# Patient Record
Sex: MALE | Race: WHITE | Employment: FULL TIME | URBAN - METROPOLITAN AREA
[De-identification: names, ages, dates, MRNs, and addresses within clinical notes are randomized per-mention and may not be internally consistent; named-entity substitution may affect disease eponyms.]

---

## 2017-09-12 ENCOUNTER — TELEPHONE (OUTPATIENT)
Dept: CARDIOLOGY CLINIC | Age: 57
End: 2017-09-12

## 2017-09-28 ENCOUNTER — OFFICE VISIT (OUTPATIENT)
Dept: CARDIOLOGY CLINIC | Age: 57
End: 2017-09-28

## 2017-09-28 VITALS
SYSTOLIC BLOOD PRESSURE: 118 MMHG | BODY MASS INDEX: 30.71 KG/M2 | WEIGHT: 247 LBS | HEIGHT: 75 IN | HEART RATE: 56 BPM | DIASTOLIC BLOOD PRESSURE: 74 MMHG

## 2017-09-28 DIAGNOSIS — I48.91 ATRIAL FIBRILLATION WITH CONTROLLED VENTRICULAR RESPONSE (HCC): ICD-10-CM

## 2017-09-28 DIAGNOSIS — G25.0 FAMILIAL TREMOR: ICD-10-CM

## 2017-09-28 DIAGNOSIS — I10 ESSENTIAL HYPERTENSION: Primary | ICD-10-CM

## 2017-09-28 PROCEDURE — 99213 OFFICE O/P EST LOW 20 MIN: CPT | Performed by: INTERNAL MEDICINE

## 2017-09-28 PROCEDURE — 93000 ELECTROCARDIOGRAM COMPLETE: CPT | Performed by: INTERNAL MEDICINE

## 2017-10-05 ENCOUNTER — PROCEDURE VISIT (OUTPATIENT)
Dept: CARDIOLOGY CLINIC | Age: 57
End: 2017-10-05

## 2017-10-05 VITALS
WEIGHT: 247 LBS | HEIGHT: 75 IN | HEART RATE: 53 BPM | BODY MASS INDEX: 30.71 KG/M2 | DIASTOLIC BLOOD PRESSURE: 60 MMHG | SYSTOLIC BLOOD PRESSURE: 124 MMHG

## 2017-10-05 DIAGNOSIS — Z82.49 FAMILY HISTORY OF CORONARY ARTERY DISEASE: ICD-10-CM

## 2017-10-05 DIAGNOSIS — I10 ESSENTIAL HYPERTENSION: ICD-10-CM

## 2017-10-05 DIAGNOSIS — I48.91 ATRIAL FIBRILLATION WITH CONTROLLED VENTRICULAR RESPONSE (HCC): Primary | ICD-10-CM

## 2017-10-05 DIAGNOSIS — Z01.818 PRE-OP EVALUATION: ICD-10-CM

## 2017-10-05 DIAGNOSIS — I48.91 ATRIAL FIBRILLATION WITH CONTROLLED VENTRICULAR RESPONSE (HCC): ICD-10-CM

## 2017-10-05 DIAGNOSIS — G25.0 FAMILIAL TREMOR: ICD-10-CM

## 2017-10-05 LAB
LV EF: 53 %
LVEF MODALITY: NORMAL

## 2017-10-05 PROCEDURE — 93015 CV STRESS TEST SUPVJ I&R: CPT | Performed by: INTERNAL MEDICINE

## 2017-10-05 PROCEDURE — 93306 TTE W/DOPPLER COMPLETE: CPT | Performed by: INTERNAL MEDICINE

## 2017-10-09 ENCOUNTER — TELEPHONE (OUTPATIENT)
Dept: CARDIOLOGY CLINIC | Age: 57
End: 2017-10-09

## 2017-10-09 NOTE — TELEPHONE ENCOUNTER
Please call Jacqui Ortiz at Dr Amanda Eduardo about cardiac clearance, [phone] ext [phone].   She is refaxing the clearance request.

## 2017-10-09 NOTE — TELEPHONE ENCOUNTER
Left message on voicemail for normal echo Per HIPAA. Normal left ventricle structure and function. Ejection fraction is low normal, visually estimated at 50-55%. No significant valvular disease noted. No evidence of pericardial effusion. Unremarkable echo.

## 2017-10-09 NOTE — LETTER
[address]  [facility] [address] [place]  Phone: [phone]    Fax [phone]                  Alicia Suarez MD, Sreedhar Munoz MD, Ada Owusu MD, Alfonse Hodgkins, MD, MD Crystal Guerrero MD Clarissa Gut, MD      Cardiac Risk Assessment       10/11/2017    Surgery Date: 10/12/17       Surgery: Excision of Parotid Tumor       Anesthesia Type: General       Fax Number: [phone]  To: Dr. Gina Gonzalez  From : Dr. Rachel Hooper    Patient Name: Yany Berman     YOB: 1960     Joseline Toribio was evaluated from a cardiac standpoint for his surgery and based on his history, diagnosis, recent cardiac testing, he is considered a low risk candidate for any dionne-operative cardiac complications. Please continue patient's current medical regimen. Please call with any further questions.     Respectfully,     Rachel Hooper AM/jerels

## 2017-10-10 NOTE — TELEPHONE ENCOUNTER
Geoff Tena from Dr. Rosalina Oshea called to check on the cardiac clearance. Pt is having surgery on Thursday 10-12.     Phone # [phone] EXT [phone]  Fax # [phone]

## 2017-10-11 ENCOUNTER — TELEPHONE (OUTPATIENT)
Dept: CARDIOLOGY CLINIC | Age: 57
End: 2017-10-11

## 2017-10-11 NOTE — TELEPHONE ENCOUNTER
Patient cleared for surgery at a low risk. Letter faxed to Dr. Tess Chavez.   Kiran Alfaro at his office notified

## 2017-10-11 NOTE — TELEPHONE ENCOUNTER
Kiran Alfaro calling from Dr. Tess Chavez office about clearance needed tomorrow, please return her call or fax clearance.

## 2021-03-01 ENCOUNTER — HOSPITAL ENCOUNTER (OUTPATIENT)
Age: 61
Discharge: HOME | End: 2021-03-01
Payer: COMMERCIAL

## 2021-03-01 ENCOUNTER — OFFICE VISIT (OUTPATIENT)
Dept: CARDIOLOGY CLINIC | Age: 61
End: 2021-03-01
Payer: COMMERCIAL

## 2021-03-01 VITALS
HEIGHT: 75 IN | BODY MASS INDEX: 27.98 KG/M2 | SYSTOLIC BLOOD PRESSURE: 120 MMHG | DIASTOLIC BLOOD PRESSURE: 80 MMHG | WEIGHT: 225 LBS

## 2021-03-01 DIAGNOSIS — Z82.49 FAMILY HISTORY OF CORONARY ARTERY DISEASE: ICD-10-CM

## 2021-03-01 DIAGNOSIS — I48.0 PAROXYSMAL ATRIAL FIBRILLATION (HCC): Primary | ICD-10-CM

## 2021-03-01 DIAGNOSIS — I10 ESSENTIAL HYPERTENSION: ICD-10-CM

## 2021-03-01 DIAGNOSIS — Z01.810 PRE-OPERATIVE CARDIOVASCULAR EXAMINATION: Primary | ICD-10-CM

## 2021-03-01 LAB
ANION GAP SERPL CALCULATED.3IONS-SCNC: 9 MMOL/L (ref 4–16)
BUN BLDV-MCNC: 12 MG/DL (ref 6–23)
CALCIUM SERPL-MCNC: 9.4 MG/DL (ref 8.3–10.6)
CHLORIDE BLD-SCNC: 104 MMOL/L (ref 99–110)
CO2: 26 MMOL/L (ref 21–32)
CREAT SERPL-MCNC: 1 MG/DL (ref 0.9–1.3)
GFR AFRICAN AMERICAN: >60 ML/MIN/1.73M2
GFR NON-AFRICAN AMERICAN: >60 ML/MIN/1.73M2
GLUCOSE BLD-MCNC: 101 MG/DL (ref 70–99)
INR BLD: 1.1 INDEX
POTASSIUM SERPL-SCNC: 4.6 MMOL/L (ref 3.5–5.1)
PROTHROMBIN TIME: 13.3 SECONDS (ref 11.7–14.5)
SODIUM BLD-SCNC: 139 MMOL/L (ref 135–145)

## 2021-03-01 PROCEDURE — 93000 ELECTROCARDIOGRAM COMPLETE: CPT | Performed by: INTERNAL MEDICINE

## 2021-03-01 PROCEDURE — 80048 BASIC METABOLIC PNL TOTAL CA: CPT

## 2021-03-01 PROCEDURE — 99214 OFFICE O/P EST MOD 30 MIN: CPT | Performed by: INTERNAL MEDICINE

## 2021-03-01 PROCEDURE — 85610 PROTHROMBIN TIME: CPT

## 2021-03-01 PROCEDURE — 36415 COLL VENOUS BLD VENIPUNCTURE: CPT

## 2021-03-01 RX ORDER — PROPAFENONE HYDROCHLORIDE 225 MG/1
225 CAPSULE, EXTENDED RELEASE ORAL 2 TIMES DAILY
Qty: 60 CAPSULE | Refills: 3 | Status: SHIPPED | OUTPATIENT
Start: 2021-03-01 | End: 2021-06-21

## 2021-03-01 NOTE — PROGRESS NOTES
OFFICE PROGRESS NOTES      Wanda Thompson is a 63 y.o. male who has    CHIEF COMPLAINT AS FOLLOWS:  CHEST PAIN: Patient denies any C/O chest pains at this time. SOB: No C/O SOB at this time. LEG EDEMA: No leg edema   PALPITATIONS: Denies any C/O Palpitations   DIZZINESS: No C/O Dizziness   SYNCOPE: None   OTHER:                                     HPI: Patient is here for F/U on his  HTN & PAF problems. Arrhythmia: Patient has known Hx of PAF with low CHADS score. HTN: Patient has known Hx of essential HTN. Has been treated with guideline recommended medical / physical/ diet therapy as stated below. He does not have any new complaints at this time. Current Outpatient Medications   Medication Sig Dispense Refill    apixaban (ELIQUIS) 5 MG TABS tablet Take 5 mg by mouth 2 times daily      ibuprofen (ADVIL;MOTRIN) 600 MG tablet Take 600 mg by mouth daily.  lisinopril (PRINIVIL;ZESTRIL) 5 MG tablet Take 1 tablet by mouth daily. 30 tablet 6    citalopram (CELEXA) 20 MG tablet Take 20 mg by mouth daily.  Multiple Vitamin (MULTIVITAMIN PO) Take 1 tablet by mouth daily.  propranolol (INDERAL LA) 80 MG CR capsule Take 80 mg by mouth daily. No current facility-administered medications for this visit. Allergies: Patient has no known allergies.   Review of Systems:    Constitutional: Negative for diaphoresis and fatigue  Respiratory: Negative for shortness of breath  Cardiovascular: Negative for chest pain, dyspnea on exertion, claudication, edema, irregular heartbeat, murmur, palpitations or shortness of breath  Musculoskeletal: Negative for muscle pain, muscular weakness, negative for pain in arm and leg or swelling in foot and leg    Objective:  /80   Ht 6' 3\" (1.905 m)   Wt 225 lb (102.1 kg)   BMI 28.12 kg/m²  80/min  Wt Readings from Last 3 Encounters:   03/01/21 225 lb (102.1 kg)   10/05/17 247 lb (112 kg)   09/28/17 247 lb (112 kg) Body mass index is 28.12 kg/m². GENERAL - Alert, oriented, pleasant, in no apparent distress. EYES: No jaundice, no conjunctival pallor. Neck - Supple. No jugular venous distention noted. No carotid bruits. Cardiovascular  Normal S1 and S2 without obvious murmur or gallop. Extremities - No cyanosis, clubbing, or significant edema. Pulmonary  No respiratory distress. No wheezes or rales. Lab Review   No results found for: TROPONINT  Lab Results   Component Value Date    BNP 19 07/22/2012     Lab Results   Component Value Date    INR 1.08 07/22/2012     No results found for: LABA1C  Lab Results   Component Value Date    WBC 6.9 07/23/2012    WBC 7.4 07/22/2012    HCT 45.9 07/23/2012    HCT 46.3 07/22/2012    MCV 91.4 07/23/2012    MCV 90.9 07/22/2012     07/23/2012     07/22/2012     Lab Results   Component Value Date    CHOL 176 09/14/2011    TRIG 55 09/14/2011    HDL 56 (L) 09/14/2011    LDLDIRECT 122 (H) 09/14/2011     Lab Results   Component Value Date    ALT 22 07/22/2012    ALT 28 06/19/2012    AST 32 07/22/2012    AST 33 06/19/2012     BMP:    Lab Results   Component Value Date     08/09/2012     07/23/2012    K 4.3 08/09/2012    K 3.8 07/23/2012     08/09/2012     07/23/2012    CO2 25 07/23/2012    CO2 26 07/22/2012    BUN 8 08/09/2012    BUN 15 07/23/2012    CREATININE 1.1 08/09/2012    CREATININE 0.9 07/23/2012     CMP:   Lab Results   Component Value Date     08/09/2012     07/23/2012    K 4.3 08/09/2012    K 3.8 07/23/2012     08/09/2012     07/23/2012    CO2 25 07/23/2012    CO2 26 07/22/2012    BUN 8 08/09/2012    BUN 15 07/23/2012    CREATININE 1.1 08/09/2012    CREATININE 0.9 07/23/2012    PROT 7.1 07/22/2012    PROT 6.9 06/19/2012     Lab Results   Component Value Date    TSHHS 1.328 07/22/2012    TSHHS 0.913 06/19/2012     ECHO 10/2017   Normal left ventricle structure and function. Ejection fraction is low normal, visually estimated at 50-55%. No significant valvular disease noted. No evidence of pericardial effusion. Unremarkable echo. ETT 10/2017   Excellent exercise capacity. 11 METs work load.    Physiological BP response to exercise.    ETT non diagnostic as THR is not achieved even at high work load. QUALITY MEASURES REVIEWED:  1.CAD:Patient is taking anti platelet agent:No  Patient does not have Hx of documented CAD  2. DYSLIPIDEMIA: Patient is on cholesterol lowering medication:No   3. Beta-Blocker therapy for CAD, if prior Myocardial Infarction:Yes   4. Counselled regarding smoking cessation. No   Patient does not Smoke. 5.Anticoagulation therapy (for A.Fib) Yes   Does Not have A.Fib.  6.Discussed weight management strategies. Assessment & Plan:    Primary / Secondary prevention is the goal by aggressive risk modification, healthy and therapeutic life style changes for cardiovascular risk reduction. Various goals are discussed and multiple questions answered. CAD:None known  HTN:Yes  well controlled on current medical regimen, see list above. - changes in  treatment:   no   CARDIOMYOPATHY:No  CONGESTIVE HEART FAILURE:No    VHD:no   No significant VHD noted  DYSLIPIDEMIA: no  ARRHYTHMIAS:yes, CHADS is 1  Patient is back in Atrial fibrillation  He is rate controlled &  on anticoagulation. OTHER RELEVANT DIAGNOSIS:as noted in patient's active problem list:  TESTS ORDERED:   TE GUIDED Cardioversion  All previously ordered tests reviewed. MEDICATIONS: CPM+                               Start rythmol SR     Office f/u TWO WEEKS AFTER CARDIOVERSION.

## 2021-03-01 NOTE — LETTER
Sadaf Carpio  1960  [id number]    Have you had any Chest Pain that is not new? - No       DO EKG IF: Patient has a Heart Rate above 100 or below 40     CAD (Coronary Artery Disease) patient should have one on file every 6 months        Have you had any Shortness of Breath - No      Have you had any dizziness - No     Sitting wait 5 minutes do supine (laying down) wait 5 minutes then do standing - log each in \"vitals\" area in Epic   Be sure to ask what symptoms they are having if they get dizzy while completing ortho stats such as room spinning, nausea, etc.    Have you had any palpitations that are not new?  - Yes  If Yes DO EKG - Do you feel your heart racing, pounding, skipping, fluttering  How long does it last - .all day   All Day     On going the last 12 days   Dizziness when he stands        Is the patient on any of the following medications -     Do you have any edema - swelling in No      Do you have a surgery or procedure scheduled in the near future - No per pt

## 2021-03-01 NOTE — PATIENT INSTRUCTIONS
Please be informed that if you contact our office outside of normal business hours the physician on call cannot help with any scheduling or rescheduling issues, procedure instruction questions or any type of medication issue. We advise you for any urgent/emergency that you go to the nearest emergency room! PLEASE CALL OUR OFFICE DURING NORMAL BUSINESS HOURS    Monday - Friday   8 am to 5 pm    [facility] [address]: [phone]    [place]:  [phone]  **It is YOUR responsibilty to bring medication bottles and/or updated medication list to [address] [place] [address]. This will allow us to better serve you and all your healthcare needs**    CAD:None known  HTN:Yes  well controlled on current medical regimen, see list above. - changes in  treatment:   no   CARDIOMYOPATHY:No  CONGESTIVE HEART FAILURE:No    VHD:no   No significant VHD noted  DYSLIPIDEMIA: no  ARRHYTHMIAS:yes, CHADS is 1  Patient is back in Atrial fibrillation  He is rate controlled &  on anticoagulation. OTHER RELEVANT DIAGNOSIS:as noted in patient's active problem list:  TESTS ORDERED:   TE GUIDED Cardioversion  All previously ordered tests reviewed. MEDICATIONS: CPM+                               Start rythmol SR     Office f/u TWO WEEKS AFTER CARDIOVERSION.

## 2021-03-01 NOTE — PROGRESS NOTES
Pt here in office & educated on ERIC/Cardioversion for Dx: Atrial Fibrillation, scheduled for 3/3/21 @ 2:00, w/arrival @ 12:00, @ [facility]; risks explained; & consents signed. Pre-admission orders given to pt for labs & CXR, which are due 3/2/21 @ [facility]. Instructions given to pt to:  NPO after midnight night before procedure; Call hospital @ [phone] to pre-register; May take morning meds the am of procedure. Patient was notified that procedure could be delayed due to an emergency. Patient voiced understanding. Copies of consent, pre-testing orders, & info. sheet scanned into media. Patient to have Covid test on arrival.  Patient advised to quarantine until after procedure.

## 2021-03-02 NOTE — H&P
HISTORY & PHYSICAL        CHIEF COMPLAINT: Irregular rhythm    HISTORY OF PRESENT ILLNESS:  Mayelin Elam is a 63 y.o. male  With Atrial fibrillation. Patient was cardioverted in the past & remained in NSR for many years & was on Rythmol. Apparently he stopped Rythmol some time ago. Now he has sustained atrial fibrillation. Shannon Kaur has the following history recorded in [facility]:  Patient Active Problem List    Diagnosis Date Noted    Family history of coronary artery disease     HTN (hypertension)     Atrial fibrillation (HCC)     [facility] ([facility]     Familial tremor     Ulcerative colitis, chronic ([facility] 07/23/2012    Atrial fibrillation with controlled ventricular response (HCC) 06/19/2012     Current Outpatient Medications   Medication Sig Dispense Refill    apixaban (ELIQUIS) 5 MG TABS tablet Take 5 mg by mouth 2 times daily      propafenone (RYTHMOL SR) 225 MG extended release capsule Take 1 capsule by mouth 2 times daily 60 capsule 3    ibuprofen (ADVIL;MOTRIN) 600 MG tablet Take 600 mg by mouth daily.  lisinopril (PRINIVIL;ZESTRIL) 5 MG tablet Take 1 tablet by mouth daily. 30 tablet 6    citalopram (CELEXA) 20 MG tablet Take 20 mg by mouth daily.  Multiple Vitamin (MULTIVITAMIN PO) Take 1 tablet by mouth daily.  propranolol (INDERAL LA) 80 MG CR capsule Take 80 mg by mouth daily. No current facility-administered medications for this encounter. Allergies: Patient has no known allergies. Past Medical History:   Diagnosis Date    Atrial fibrillation [facility]) 2006 7/23/12 Successful direct current cardioversion    Familial tremor     Family history of coronary artery disease     H/O 24 hour EKG monitoring 4/6/06    The 48 holter monitor reveals the patient in SR with rare ventricular ectopic beat and rare supraventricular ectopic beat.     H/O cardiovascular stress test 12    Probably normal perfusion Lexiscan Cardiolite study except for diaphragmatic artifact. Low-normal LV function, EF 50%    H/O echocardiogram 12    Mild left atrial enlargement and right ventricular enlargement. Left ventricular hypertrophy with global hypokinesis and moderately reduced LV function. EF 35-40%  Mild pulmomary HTN. Mild TR    H/O transesophageal echocardiography (ERIC) for monitoring 12    Other than mild MR and TR, no other significant abnormalities seen. There is no intracavitary mass or thrombus, there is no atrial septal defect or patent foramen ovale    History of Doppler echocardiogram 10/05/2017    Normal left ventricle structure and function. Ejection fraction is low normal, visually estimated at 50-55%. No significant valvular disease noted. No evidence of pericardial effusion. Unremarkable echo.  History of exercise stress test 10/05/2017    treadmill    HTN (hypertension)     Pre-op evaluation     Ulcerative colitis ([facility]      Past Surgical History:   Procedure Laterality Date    COLECTOMY      PROCTECTOMY       Family History   Problem Relation Age of Onset    Heart Disease Mother     High Blood Pressure Mother     Heart Disease Father     Birth Defects Maternal Grandmother      Social History     Tobacco Use    Smoking status: Former Smoker     Quit date: 3/1/2001     Years since quittin.0    Smokeless tobacco: Never Used   Substance Use Topics    Alcohol use: Yes     Comment: 2-3 beers 2-3 times weekly      Review of systems:  HEENT: Neg  Card:Neg   GI;Neg  : Neg  Neuro: Neg  Psych: Neg  Derm: Neg  MS; Neg  All: Documented  Constitutional: Neg    Objective:    /80   Ht 6' 3\" (1.905 m)   Wt 225 lb (102.1 kg)   BMI 28.12 kg/m²  80/min    Wt Readings from Last 3 Encounters:   21 225 lb (102.1 kg)   10/05/17 247 lb (112 kg)   17 247 lb (112 kg)     GENERAL - Alert, oriented, pleasant, in no apparent distress. HEENT - Unremarkable. Neck - Supple. No jugular venous distention noted. No carotid bruits. Cardiovascular - Normal S1 and S2 without obvious murmur or gallop. Extremities - No cyanosis, clubbing, or significant edema. Pulmonary - No respiratory distress. No wheezes or rales. Abdomen - No masses, tenderness, or organomegaly. Musculoskeletal - No significant edema. Neurologic - Cranial nerves II through XII are grossly intact. There were no gross focal neurologic abnormalities. Lab Review   Lab Results   Component Value Date    CKTOTAL 133 07/22/2012    CKMB 2.6 07/22/2012    TROPONINI <0.006 07/23/2012     BNP:    Lab Results   Component Value Date    BNP 19 07/22/2012     PT/INR:  No results found for: PTINR  No results found for: LABA1C  Lab Results   Component Value Date    CHOL 176 09/14/2011    TRIG 55 09/14/2011    HDL 56 (L) 09/14/2011    LDLDIRECT 122 (H) 09/14/2011     Lab Results   Component Value Date    ALT 22 07/22/2012    AST 32 07/22/2012     BMP:    Lab Results   Component Value Date     03/01/2021    K 4.6 03/01/2021     03/01/2021    CO2 26 03/01/2021    BUN 12 03/01/2021     CMP:   Lab Results   Component Value Date     03/01/2021    K 4.6 03/01/2021     03/01/2021    CO2 26 03/01/2021    BUN 12 03/01/2021    PROT 7.1 07/22/2012     TSH:  No results found for: TSH      Impression:    Patient Active Problem List   Diagnosis    Atrial fibrillation with controlled ventricular response ([address]    Ulcerative colitis, chronic ([address]    HTN (hypertension)    Atrial fibrillation (HCC)    Ulcerative colitis ([address]    Familial tremor    Family history of coronary artery disease       Plan:  TE GUIDED Cardioversion  Informed consent obtained. ASA & Mallampati 2:2  Further recommendations to be based on results.

## 2021-03-03 ENCOUNTER — HOSPITAL ENCOUNTER (OUTPATIENT)
Age: 61
Discharge: HOME | End: 2021-03-03
Payer: COMMERCIAL

## 2021-03-03 ENCOUNTER — HOSPITAL ENCOUNTER (OUTPATIENT)
Dept: NON INVASIVE DIAGNOSTICS | Age: 61
Discharge: HOME | End: 2021-03-03
Payer: COMMERCIAL

## 2021-03-03 VITALS
OXYGEN SATURATION: 97 % | DIASTOLIC BLOOD PRESSURE: 69 MMHG | RESPIRATION RATE: 18 BRPM | SYSTOLIC BLOOD PRESSURE: 100 MMHG | HEART RATE: 60 BPM

## 2021-03-03 LAB
EKG ATRIAL RATE: 51 BPM
EKG ATRIAL RATE: 87 BPM
EKG DIAGNOSIS: NORMAL
EKG DIAGNOSIS: NORMAL
EKG P AXIS: 62 DEGREES
EKG P-R INTERVAL: 174 MS
EKG Q-T INTERVAL: 364 MS
EKG Q-T INTERVAL: 402 MS
EKG QRS DURATION: 86 MS
EKG QRS DURATION: 96 MS
EKG QTC CALCULATION (BAZETT): 370 MS
EKG QTC CALCULATION (BAZETT): 381 MS
EKG R AXIS: 60 DEGREES
EKG R AXIS: 67 DEGREES
EKG T AXIS: 62 DEGREES
EKG T AXIS: 62 DEGREES
EKG VENTRICULAR RATE: 51 BPM
EKG VENTRICULAR RATE: 66 BPM
SARS-COV-2, NAAT: NOT DETECTED
SOURCE: NORMAL

## 2021-03-03 PROCEDURE — 92960 CARDIOVERSION ELECTRIC EXT: CPT

## 2021-03-03 PROCEDURE — 93005 ELECTROCARDIOGRAM TRACING: CPT | Performed by: INTERNAL MEDICINE

## 2021-03-03 PROCEDURE — 93312 ECHO TRANSESOPHAGEAL: CPT

## 2021-03-03 PROCEDURE — 93010 ELECTROCARDIOGRAM REPORT: CPT | Performed by: INTERNAL MEDICINE

## 2021-03-03 PROCEDURE — 92960 CARDIOVERSION ELECTRIC EXT: CPT | Performed by: INTERNAL MEDICINE

## 2021-03-03 PROCEDURE — 7100000000 HC PACU RECOVERY - FIRST 15 MIN

## 2021-03-03 PROCEDURE — 93312 ECHO TRANSESOPHAGEAL: CPT | Performed by: INTERNAL MEDICINE

## 2021-03-03 PROCEDURE — 87635 SARS-COV-2 COVID-19 AMP PRB: CPT

## 2021-03-03 PROCEDURE — 7100000001 HC PACU RECOVERY - ADDTL 15 MIN

## 2021-03-03 NOTE — PROCEDURES
TE Guided D/C cardioversion was performed successfully with no complications.   Dictation # [id number]

## 2021-03-04 NOTE — OP NOTE
[address], [address] [place] [address]                                OPERATIVE REPORT    PATIENT NAME: Sangeetha Calderon              :        1960  MED REC NO:   [id number]                          ROOM:  ACCOUNT NO:   [de-identified]                           ADMIT DATE: 2021  PROVIDER:     Junior Wills MD    DATE OF PROCEDURE:  2021    REFERRING PHYSICIAN:  Heidi Duncan MD    BRIEF HISTORY:  The patient is a 58-year-old gentleman with known  history of atrial fibrillation in the past, who had cardioversion and  had remained in sinus rhythm, on Rythmol therapy for many years, then  for some time, he has not been on Rythmol therapy and he did go back  into atrial fibrillation which is now sustained for several weeks. When  I saw him in office, I started him on Eliquis therapy and gave him a  prescription for Rythmol for this time. The patient was scheduled to  undergo ERIC-guided cardioversion. INDICATION FOR THE PROCEDURE:  Sustained atrial fibrillation. DESCRIPTION OF PROCEDURE:  Once transesophageal echocardiographic study  revealed that there is no spontaneous contrast or thrombus in the left  atrial chamber or left atrial appendage, direct current cardioversion  was performed utilizing standard technique with a biphasic machine and  remotely controlled pads. The patient was under IV Versed and fentanyl  sedation. A single shock utilizing 120 joules synchronized energy was  used. The patient converted to normal sinus rhythm. There were no  immediate complications. A 12-lead EKG was obtained. PLAN:  The patient to continue Rythmol SR and Eliquis therapy. A  followup is arranged in my office in a month's time.         Jeffrey Caballero MD    D: 2021 18:49:08       T: 2021 21:37:07     [id number]  Job#: [id number]     Doc#: [id number]    CC:

## 2021-03-12 ENCOUNTER — OFFICE VISIT (OUTPATIENT)
Dept: CARDIOLOGY CLINIC | Age: 61
End: 2021-03-12
Payer: COMMERCIAL

## 2021-03-12 VITALS
WEIGHT: 224 LBS | SYSTOLIC BLOOD PRESSURE: 112 MMHG | HEIGHT: 75 IN | BODY MASS INDEX: 27.85 KG/M2 | DIASTOLIC BLOOD PRESSURE: 72 MMHG

## 2021-03-12 DIAGNOSIS — Z82.49 FAMILY HISTORY OF CORONARY ARTERY DISEASE: ICD-10-CM

## 2021-03-12 DIAGNOSIS — I48.0 PAROXYSMAL ATRIAL FIBRILLATION (HCC): Primary | ICD-10-CM

## 2021-03-12 DIAGNOSIS — G25.0 FAMILIAL TREMOR: ICD-10-CM

## 2021-03-12 DIAGNOSIS — I48.91 ATRIAL FIBRILLATION WITH CONTROLLED VENTRICULAR RESPONSE (HCC): ICD-10-CM

## 2021-03-12 DIAGNOSIS — I10 ESSENTIAL HYPERTENSION: ICD-10-CM

## 2021-03-12 PROCEDURE — 99213 OFFICE O/P EST LOW 20 MIN: CPT | Performed by: INTERNAL MEDICINE

## 2021-03-12 NOTE — LETTER
Kristan Neely Dial  1960  [id number]    Have you had any Chest Pain that is not new? - No    Have you had any Shortness of Breath - No    Have you had any dizziness - No    Have you had any palpitations that are not new?  - No      Is the patient on any of the following medications - Rhythmol (Propafenone)  If Yes DO EKG - Needs done every 6 months    Do you have any edema - swelling in No      Do you have a surgery or procedure scheduled in the near future - No per pt

## 2021-03-12 NOTE — PROGRESS NOTES
OFFICE PROGRESS NOTES      Tripp Deleon is a 63 y.o. male who has    CHIEF COMPLAINT AS FOLLOWS:  CHEST PAIN: Patient denies any C/O chest pains at this time. SOB: No C/O SOB at this time. LEG EDEMA: No leg edema   PALPITATIONS: Denies any C/O Palpitations   DIZZINESS: No C/O Dizziness   SYNCOPE: None   OTHER:                                     HPI: Patient is here for F/U on his HTN & PAF problems. Arrhythmia: Patient has known Hx of PAF. HTN: Patient has known Hx of essential HTN. Has been treated with guideline recommended medical / physical/ diet therapy as stated below. He does not have any new complaints at this time. Current Outpatient Medications   Medication Sig Dispense Refill    propafenone (RYTHMOL SR) 225 MG extended release capsule Take 1 capsule by mouth 2 times daily 60 capsule 3    lisinopril (PRINIVIL;ZESTRIL) 5 MG tablet Take 1 tablet by mouth daily. 30 tablet 6    citalopram (CELEXA) 20 MG tablet Take 20 mg by mouth daily.  Multiple Vitamin (MULTIVITAMIN PO) Take 1 tablet by mouth daily.  propranolol (INDERAL LA) 80 MG CR capsule Take 80 mg by mouth daily.  ibuprofen (ADVIL;MOTRIN) 600 MG tablet Take 600 mg by mouth daily. No current facility-administered medications for this visit. Allergies: Patient has no known allergies.   Review of Systems:    Constitutional: Negative for diaphoresis and fatigue  Respiratory: Negative for shortness of breath  Cardiovascular: Negative for chest pain, dyspnea on exertion, claudication, edema, irregular heartbeat, murmur, palpitations or shortness of breath  Musculoskeletal: Negative for muscle pain, muscular weakness, negative for pain in arm and leg or swelling in foot and leg    Objective:  /72   Ht 6' 3\" (1.905 m)   Wt 224 lb (101.6 kg)   BMI 28.00 kg/m²   Wt Readings from Last 3 Encounters:   03/12/21 224 lb (101.6 kg)   03/01/21 225 lb (102.1 kg)   10/05/17 247 lb (112 kg)     Body mass index is 28 kg/m². GENERAL - Alert, oriented, pleasant, in no apparent distress. EYES: No jaundice, no conjunctival pallor. Neck - Supple. No jugular venous distention noted. No carotid bruits. Cardiovascular  Normal S1 and S2 without obvious murmur or gallop. Extremities - No cyanosis, clubbing, or significant edema. Pulmonary  No respiratory distress. No wheezes or rales. Lab Review   No results found for: TROPONINT  Lab Results   Component Value Date    BNP 19 07/22/2012     Lab Results   Component Value Date    INR 1.10 03/01/2021    INR 1.08 07/22/2012     No results found for: LABA1C  Lab Results   Component Value Date    WBC 6.9 07/23/2012    WBC 7.4 07/22/2012    HCT 45.9 07/23/2012    HCT 46.3 07/22/2012    MCV 91.4 07/23/2012    MCV 90.9 07/22/2012     07/23/2012     07/22/2012     Lab Results   Component Value Date    CHOL 176 09/14/2011    TRIG 55 09/14/2011    HDL 56 (L) 09/14/2011    LDLDIRECT 122 (H) 09/14/2011     Lab Results   Component Value Date    ALT 22 07/22/2012    ALT 28 06/19/2012    AST 32 07/22/2012    AST 33 06/19/2012     BMP:    Lab Results   Component Value Date     03/01/2021     08/09/2012    K 4.6 03/01/2021    K 4.3 08/09/2012     03/01/2021     08/09/2012    CO2 26 03/01/2021    CO2 25 07/23/2012    BUN 12 03/01/2021    BUN 8 08/09/2012    CREATININE 1.0 03/01/2021    CREATININE 1.1 08/09/2012     CMP:   Lab Results   Component Value Date     03/01/2021     08/09/2012    K 4.6 03/01/2021    K 4.3 08/09/2012     03/01/2021     08/09/2012    CO2 26 03/01/2021    CO2 25 07/23/2012    BUN 12 03/01/2021    BUN 8 08/09/2012    CREATININE 1.0 03/01/2021    CREATININE 1.1 08/09/2012    PROT 7.1 07/22/2012    PROT 6.9 06/19/2012     Lab Results   Component Value Date    TSH 1.328 07/22/2012    TSHHS 0.913 06/19/2012     EKG: Sinus BRADYCARDIA 50/min.  QTc 415  QUALITY MEASURES REVIEWED: 1.CAD:Patient is taking anti platelet agent:No  Patient does not have Hx of documented CAD  2. DYSLIPIDEMIA: Patient is on cholesterol lowering medication:No   Patient does not tolerate, secondary to side-effects. 3.Beta-Blocker therapy for CAD, if prior Myocardial Infarction:Yes   Due to side-effect(s) / Bradycardia  4. Counselled regarding smoking cessation. No   Patient does not Smoke. 5.Anticoagulation therapy (for A.Fib) Yes   Does Not have A.Fib.  6.Discussed weight management strategies. Assessment & Plan:    Primary / Secondary prevention is the goal by aggressive risk modification, healthy and therapeutic life style changes for cardiovascular risk reduction. Various goals are discussed and multiple questions answered. CAD:None known  HTN:Yes  well controlled on current medical regimen, see list above. - changes in  treatment:   no   CARDIOMYOPATHY:No  CONGESTIVE HEART FAILURE:No    VHD:no              No significant VHD noted  DYSLIPIDEMIA: no  ARRHYTHMIAS:yes, CHADS is 1  Atrial fibrillation S/P Cardioversion  He is rate controlled &  on anticoagulation. Patient can stop Eliquis a month after cardioversion. Stay on Rythmol. OTHER RELEVANT DIAGNOSIS:as noted in patient's active problem list:  TESTS ORDERED:   None this visit. All previously ordered tests reviewed. MEDICATIONS: CPM. Propranolol is for tremors. Office f/u in 3 months.

## 2021-03-12 NOTE — PATIENT INSTRUCTIONS
Please be informed that if you contact our office outside of normal business hours the physician on call cannot help with any scheduling or rescheduling issues, procedure instruction questions or any type of medication issue. We advise you for any urgent/emergency that you go to the nearest emergency room! PLEASE CALL OUR OFFICE DURING NORMAL BUSINESS HOURS    Monday - Friday   8 am to 5 pm    Debbie Wu 12: [phone]    [place]:  [phone]  **It is YOUR responsibilty to bring medication bottles and/or updated medication list to [address] [place] [address]. This will allow us to better serve you and all your healthcare needs**    CAD:None known  HTN:Yes  well controlled on current medical regimen, see list above. - changes in  treatment:   no   CARDIOMYOPATHY:No  CONGESTIVE HEART FAILURE:No    VHD:no              No significant VHD noted  DYSLIPIDEMIA: no  ARRHYTHMIAS:yes, CHADS is 1  Atrial fibrillation S/P Cardioversion  He is rate controlled &  on anticoagulation. Patient can stop Eliquis a month after cardioversion. Stay on Rythmol. OTHER RELEVANT DIAGNOSIS:as noted in patient's active problem list:  TESTS ORDERED:   None this visit. All previously ordered tests reviewed. MEDICATIONS: CPM. Propranolol is for tremors. Office f/u in 3 months.

## 2021-03-12 NOTE — LETTER
[facility]  [address] [address]  Phone: [phone]  Fax: [phone]    Vini Hernandez MD        March 12, 2021     Vera Monday  [address] [address]    Patient: Maliha Abarca  MR Number: [id number]  YOB: 1960  Date of Visit: 3/12/2021    Dear Dr. Gamez Monday: Thank you for the request for consultation for Maliha Abarca to me for the evaluation of PAF. Below are the relevant portions of my assessment and plan of care. If you have questions, please do not hesitate to call me. I look forward to following Vito Merino along with you.     Sincerely,        Vini Hernandez MD

## 2021-06-07 ENCOUNTER — OFFICE VISIT (OUTPATIENT)
Dept: CARDIOLOGY CLINIC | Age: 61
End: 2021-06-07
Payer: COMMERCIAL

## 2021-06-07 VITALS
DIASTOLIC BLOOD PRESSURE: 82 MMHG | BODY MASS INDEX: 27.6 KG/M2 | SYSTOLIC BLOOD PRESSURE: 122 MMHG | HEART RATE: 63 BPM | WEIGHT: 222 LBS | HEIGHT: 75 IN

## 2021-06-07 DIAGNOSIS — Z82.49 FAMILY HISTORY OF CORONARY ARTERY DISEASE: ICD-10-CM

## 2021-06-07 DIAGNOSIS — G25.0 FAMILIAL TREMOR: ICD-10-CM

## 2021-06-07 DIAGNOSIS — I48.0 PAROXYSMAL ATRIAL FIBRILLATION (HCC): ICD-10-CM

## 2021-06-07 DIAGNOSIS — I10 ESSENTIAL HYPERTENSION: Primary | ICD-10-CM

## 2021-06-07 PROCEDURE — 99213 OFFICE O/P EST LOW 20 MIN: CPT | Performed by: INTERNAL MEDICINE

## 2021-06-07 NOTE — LETTER
[facility] [address]  [address] [address]  Phone: [phone]  Fax: [phone]    Delio Gan MD    June 7, 2021     Enmanuel Sorenson  [address] [place] [address]    Patient: Karlie James   MR Number: [id number]   YOB: 1960   Date of Visit: 6/7/2021       Dear Enmanuel Sorenson: Thank you for referring Karlie James to me for evaluation/treatment. Below are the relevant portions of my assessment and plan of care. If you have questions, please do not hesitate to call me. I look forward to following Unruly Valenzuela along with you.     Sincerely,        Delio Gan MD

## 2021-06-07 NOTE — PATIENT INSTRUCTIONS
Please be informed that if you contact our office outside of normal business hours the physician on call cannot help with any scheduling or rescheduling issues, procedure instruction questions or any type of medication issue. We advise you for any urgent/emergency that you go to the nearest emergency room! PLEASE CALL OUR OFFICE DURING NORMAL BUSINESS HOURS    Monday - Friday   8 am to 5 pm    [place] Wu 12: [phone]    [place]:  [phone]  **It is YOUR responsibilty to bring medication bottles and/or updated medication list to [address] [place] [address]. This will allow us to better serve you and all your healthcare needs**    CAD:None known  HTN:Yes  well controlled on current medical regimen, see list above. - changes in  treatment:   no   CARDIOMYOPATHY:No  [place]  VHD:no              [place] significant VHD noted  DYSLIPIDEMIA: no  ARRHYTHMIAS:yes, CHADS is 1  Atrial fibrillation S/P Cardioversion  He is rate controlled & not on anticoagulation. CHADS score is only one. Stay on Rythmol. OTHER RELEVANT DIAGNOSIS:as noted in patient's active problem list:  TESTS ORDERED:   None this visit. All previously ordered tests reviewed.  MEDICATIONS: CPM. Propranolol is for tremors. Office f/u in six months.

## 2021-06-07 NOTE — LETTER
2021     4:45pm    Patient Name: Helen Desouza  : 1960  MRN# [id number]    REASON FOR VISIT:  Atrial fibrillation with controlled ventricular response (HCC)  HTN (hypertension)  Atrial fibrillation (HCC)    Current Outpatient Medications   Medication Sig Dispense Refill    apixaban (ELIQUIS) 5 MG TABS tablet Take 1 tablet by mouth 2 times daily 60 tablet 5    propafenone (RYTHMOL SR) 225 MG extended release capsule Take 1 capsule by mouth 2 times daily 60 capsule 3    lisinopril (PRINIVIL;ZESTRIL) 5 MG tablet Take 1 tablet by mouth daily. 30 tablet 6    citalopram (CELEXA) 20 MG tablet Take 20 mg by mouth daily.  Multiple Vitamin (MULTIVITAMIN PO) Take 1 tablet by mouth daily.  propranolol (INDERAL LA) 80 MG CR capsule Take 80 mg by mouth daily. No current facility-administered medications for this visit. Smoke: What:                           How much:    Alcohol: How Much:     Caffeine: Pop:         Tea:            Coffee:                Chocolate:    Exercise:    Labs: Lipids:             CBC:       BMP/CMP:          TSH:              A1C:    Last Visit:  Complaints:  Changes:    Last EKG:      STRESS TEST:  10/2017   Excellent exercise capacity. 11 METs work load.    Physiological BP response to exercise.    ETT non diagnostic as THR is not achieved even at high work load.         ECHO: 10/2017  Normal left ventricle structure and function. Ejection fraction is low normal, visually estimated at 50-55%. No significant valvular disease noted. No evidence of pericardial effusion.    Unremarkable echo    CAROTID: NONE    MUGA: NONE    LAST PACER CHECK: NONE    CARDIAC CATH: NONE    Amio Protocol:    CHADS: [id number] Score for Atrial Fibrillation Stroke Risk   Risk   Factors  Component Value   C CHF No 0   H HTN Yes 1   A2 Age >= 75 No,  (61 y.o.) 0   D DM No 0   S2 Prior Stroke/TIA No 0   V Vascular Disease No 0   A Age 74-69 No,  (61 y.o.) 0   Sc Sex male 0    [id number]  Score  1   Score last updated 6/7/21 26:67 AM EDT    Click here for a link to the UpToDate guideline \"Atrial Fibrillation: Anticoagulation therapy to prevent embolization    Disclaimer: Risk Score calculation is dependent on accuracy of patient problem list and past encounter diagnosis.

## 2021-06-07 NOTE — PROGRESS NOTES
OFFICE PROGRESS NOTES      Unruly Valenzuela is a 63 y.o. male who has    CHIEF COMPLAINT AS FOLLOWS:  CHEST PAIN: Patient denies any C/O chest pains at this time.      SOB: No C/O SOB at this time.              LEG EDEMA: No leg edema   PALPITATIONS: Denies any C/O Palpitations   DIZZINESS: No C/O Dizziness   SYNCOPE: None   OTHER:                                    HPI: Patient is here for F/U on his PAF- Arrhythmia & HTN problems. Arrhythmia: Patient has known Hx of PAF. HTN: Patient has known Hx of essential HTN. Has been treated with guideline recommended medical / physical/ diet therapy as stated below. He does not have any new complaints at this time. Current Outpatient Medications   Medication Sig Dispense Refill    apixaban (ELIQUIS) 5 MG TABS tablet Take 1 tablet by mouth 2 times daily 60 tablet 5    propafenone (RYTHMOL SR) 225 MG extended release capsule Take 1 capsule by mouth 2 times daily 60 capsule 3    lisinopril (PRINIVIL;ZESTRIL) 5 MG tablet Take 1 tablet by mouth daily. 30 tablet 6    citalopram (CELEXA) 20 MG tablet Take 20 mg by mouth daily.  Multiple Vitamin (MULTIVITAMIN PO) Take 1 tablet by mouth daily.  propranolol (INDERAL LA) 80 MG CR capsule Take 80 mg by mouth daily. No current facility-administered medications for this visit. Allergies: Patient has no known allergies.   Review of Systems:    Constitutional: Negative for diaphoresis and fatigue  Respiratory: Negative for shortness of breath  Cardiovascular: Negative for chest pain, dyspnea on exertion, claudication, edema, irregular heartbeat, murmur, palpitations or shortness of breath  Musculoskeletal: Negative for muscle pain, muscular weakness, negative for pain in arm and leg or swelling in foot and leg    Objective:  Ht 6' 3\" (1.905 m)   Wt 222 lb (100.7 kg)   BMI 27.75 kg/m²   Wt Readings from Last 3 Encounters:   06/07/21 222 lb (100.7 kg)   03/12/21 224 lb (101.6 kg)   03/01/21 work load.    Physiological BP response to exercise.    ETT non diagnostic as THR is not achieved even at high work load. ECHO 10/2017   Normal left ventricle structure and function. Ejection fraction is low normal, visually estimated at 50-55%. No significant valvular disease noted. No evidence of pericardial effusion. Unremarkable echo. QUALITY MEASURES REVIEWED:  1.CAD:Patient is taking anti platelet agent:No  Patient does not have Hx of documented CAD  2. DYSLIPIDEMIA: Patient is on cholesterol lowering medication:No   3. Beta-Blocker therapy for CAD, if prior Myocardial Infarction:Yes  4. Counselled regarding smoking cessation. No   Patient does not Smoke. 5.Anticoagulation therapy (for A.Fib) No   Does Not have A.Fib.  6.Discussed weight management strategies. Assessment & Plan:    Primary / Secondary prevention is the goal by aggressive risk modification, healthy and therapeutic life style changes for cardiovascular risk reduction. Various goals are discussed and multiple questions answered. CAD:None known  HTN:Yes  well controlled on current medical regimen, see list above. - changes in  treatment:   no   CARDIOMYOPATHY:No  [facility]  VHD:no              [place] significant VHD noted  DYSLIPIDEMIA: no  ARRHYTHMIAS:yes, CHADS is 1  Atrial fibrillation S/P Cardioversion  He is rate controlled & not on anticoagulation. CHADS score is only one. Stay on Rythmol. OTHER RELEVANT DIAGNOSIS:as noted in patient's active problem list:  TESTS ORDERED:   None this visit. All previously ordered tests reviewed.  MEDICATIONS: CPM. Propranolol is for tremors. Office f/u in six months.

## 2021-06-21 RX ORDER — PROPAFENONE HYDROCHLORIDE 225 MG/1
225 CAPSULE, EXTENDED RELEASE ORAL 2 TIMES DAILY
Qty: 180 CAPSULE | Refills: 1 | Status: SHIPPED | OUTPATIENT
Start: 2021-06-21 | End: 2021-12-20

## 2021-06-28 ENCOUNTER — TELEPHONE (OUTPATIENT)
Dept: CARDIOLOGY CLINIC | Age: 61
End: 2021-06-28

## 2021-06-29 NOTE — TELEPHONE ENCOUNTER
Attempted to call patient back no answer. Left message asking to return my call to possible scheduled nurse visit for EKG.  Advised Dr Patricia Fajardo is out of the office

## 2021-07-01 ENCOUNTER — NURSE ONLY (OUTPATIENT)
Dept: CARDIOLOGY CLINIC | Age: 61
End: 2021-07-01
Payer: COMMERCIAL

## 2021-07-01 VITALS
HEIGHT: 75 IN | BODY MASS INDEX: 28.1 KG/M2 | WEIGHT: 226 LBS | HEART RATE: 86 BPM | SYSTOLIC BLOOD PRESSURE: 125 MMHG | DIASTOLIC BLOOD PRESSURE: 85 MMHG

## 2021-07-01 DIAGNOSIS — I48.91 ATRIAL FIBRILLATION WITH CONTROLLED VENTRICULAR RESPONSE (HCC): Primary | ICD-10-CM

## 2021-07-01 DIAGNOSIS — I10 ESSENTIAL HYPERTENSION: ICD-10-CM

## 2021-07-01 PROCEDURE — 93000 ELECTROCARDIOGRAM COMPLETE: CPT | Performed by: NURSE PRACTITIONER

## 2021-07-01 PROCEDURE — 99215 OFFICE O/P EST HI 40 MIN: CPT | Performed by: NURSE PRACTITIONER

## 2021-07-01 PROCEDURE — 99417 PROLNG OP E/M EACH 15 MIN: CPT | Performed by: NURSE PRACTITIONER

## 2021-07-01 NOTE — PROGRESS NOTES
[facility] [place]  Ceasar Espinosa  Phone: [phone]    Fax [phone]    Redd Butcher MD, Linn White MD, Isaías Mccollum MD, MD Shona Donis MD Duffy Ivanoff, MD Bib Cordon, MD Alejandra Cespedes, APRN      Arianna Kapadia, WILBER Watkins, [facility], APRN    CARDIOLOGY  NOTE    2021    Yany Flannery (:  1960) is a 63 y.o. male,Established patient with Dr. Ezra Hamilton, here for evaluation of the following chief complaint(s): Other (pt. here for Afib. Pt. denies chest pain, dizziness and edema. ) and Shortness of Breath (on exertion and weakness. )        SUBJECTIVE/OBJECTIVE:    Patient states that after his bike ride yesterday he noticed his HR was irregular. He had a difficult time catching his breath during his bike ride. Today he has difficulty breathing with walking any distances. Review of Systems   Constitutional: Negative for fatigue and fever. Respiratory: Positive for shortness of breath. Negative for cough. Cardiovascular: Positive for palpitations. Negative for chest pain and leg swelling. Musculoskeletal: Negative for arthralgias and gait problem. Neurological: Negative for dizziness, syncope, weakness, light-headedness and headaches. Vitals:    21 1152   BP: 125/85   Pulse: 86   Weight: 226 lb (102.5 kg)   Height: 6' 3\" (1.905 m)       Wt Readings from Last 3 Encounters:   21 226 lb (102.5 kg)   21 222 lb (100.7 kg)   21 224 lb (101.6 kg)       BP Readings from Last 3 Encounters:   21 125/85   21 122/82   21 112/72       Prior to Admission medications    Medication Sig Start Date End Date Taking?  Authorizing Provider   rivaroxaban (XARELTO) 20 MG TABS tablet Take 1 tablet by mouth daily (with breakfast) 21  Yes WILBER Box - CNP   propafenone (RYTHMOL SR) 225 MG extended release capsule Take 1 capsule by mouth 2 times daily 6/21/21  Yes Stefani Zhong MD   lisinopril (PRINIVIL;ZESTRIL) 5 MG tablet Take 1 tablet by mouth daily. 12/4/12  Yes Stefani Zhong MD   citalopram (CELEXA) 20 MG tablet Take 20 mg by mouth daily. Yes Historical Provider, MD   Multiple Vitamin (MULTIVITAMIN PO) Take 1 tablet by mouth daily. Yes Historical Provider, MD   propranolol (INDERAL LA) 80 MG CR capsule Take 80 mg by mouth daily. Yes Historical Provider, MD   apixaban (ELIQUIS) 5 MG TABS tablet Take 1 tablet by mouth 2 times daily  Patient not taking: Reported on 7/1/2021 3/30/21   Stefani Zhong MD       Physical Exam  Vitals reviewed. Constitutional:       General: He is not in acute distress. Appearance: Normal appearance. He is not ill-appearing. HENT:      Head: Atraumatic. Neck:      Vascular: No carotid bruit. Cardiovascular:      Rate and Rhythm: Tachycardia present. Rhythm irregular. Pulses: Normal pulses. Heart sounds: Normal heart sounds. No murmur heard. Pulmonary:      Effort: Pulmonary effort is normal. No respiratory distress. Breath sounds: Normal breath sounds. Musculoskeletal:         General: No swelling or deformity. Cervical back: Neck supple. No muscular tenderness. Neurological:      Mental Status: He is alert.          Health Maintenance   Topic Date Due    Hepatitis C screen  Never done    HIV screen  Never done    Diabetes screen  Never done    Colon cancer screen colonoscopy  Never done    Lipid screen  09/14/2016    Shingles Vaccine (2 of 2) 08/25/2020    Flu vaccine (1) 09/01/2021    Potassium monitoring  03/01/2022    Creatinine monitoring  03/01/2022    DTaP/Tdap/Td vaccine (2 - Td or Tdap) 06/30/2030    COVID-19 Vaccine  Completed    Hepatitis A vaccine  Aged Out    Hepatitis B vaccine  Aged Out    Hib vaccine  Aged Out    Meningococcal (ACWY) vaccine  Aged Out    Pneumococcal 0-64 years Vaccine  Aged [place] ASSESSMENT/PLAN:    1. Atrial fibrillation with controlled ventricular response ([address]  patient is in atrial fibrillation  He would like to have cardioversion done ASAP. Discussed  [facility] is off and will arrange with Dr. Brittany Addison. Patient agreeable. Patient would like to change from eliquis to xarelto. Discussed free 30 days and copay card as he has commercial insurance. Continue rhythmol  -     EKG 12 lead  2. Essential hypertension  Well controlled. Continue lisinopril  -     EKG 12 lead      No follow-ups on file. An electronic signature was used to authenticate this note.     Electronically signed by WILBER Lagos CNP on 7/1/2021 at 1:05 PM

## 2021-07-06 ENCOUNTER — HOSPITAL ENCOUNTER (OUTPATIENT)
Age: 61
Discharge: HOME | End: 2021-07-06
Payer: COMMERCIAL

## 2021-07-06 ENCOUNTER — NURSE ONLY (OUTPATIENT)
Dept: CARDIOLOGY CLINIC | Age: 61
End: 2021-07-06
Payer: COMMERCIAL

## 2021-07-06 LAB
ANION GAP SERPL CALCULATED.3IONS-SCNC: 11 MMOL/L (ref 4–16)
BUN BLDV-MCNC: 17 MG/DL (ref 6–23)
CALCIUM SERPL-MCNC: 9.7 MG/DL (ref 8.3–10.6)
CHLORIDE BLD-SCNC: 103 MMOL/L (ref 99–110)
CO2: 27 MMOL/L (ref 21–32)
CREAT SERPL-MCNC: 0.8 MG/DL (ref 0.9–1.3)
GFR AFRICAN AMERICAN: >60 ML/MIN/1.73M2
GFR NON-AFRICAN AMERICAN: >60 ML/MIN/1.73M2
GLUCOSE BLD-MCNC: 102 MG/DL (ref 70–99)
POTASSIUM SERPL-SCNC: 4.4 MMOL/L (ref 3.5–5.1)
SODIUM BLD-SCNC: 141 MMOL/L (ref 135–145)

## 2021-07-06 PROCEDURE — 36415 COLL VENOUS BLD VENIPUNCTURE: CPT

## 2021-07-06 PROCEDURE — 99211 OFF/OP EST MAY X REQ PHY/QHP: CPT | Performed by: INTERNAL MEDICINE

## 2021-07-06 PROCEDURE — 80048 BASIC METABOLIC PNL TOTAL CA: CPT

## 2021-07-06 NOTE — PROGRESS NOTES
Patient here in office & educated on ERIC/CV for Dx: AFIB, scheduled for [email or web address], w/arrival @ 930, @ [facility]. Risks explained; & consents signed. Pre-admission orders given to pt for labs which are due 7/6/21 @ 1840 [facility]. Instructions given to pt to: odilia PEREIRA after midnight the night before procedure. Patient to call hospital @ [phone] to pre-register. May take morning meds the morning of procedure. Patient was notified that procedure could be delayed due to an emergency. Patient voiced understanding. Copies of consent, pre-testing orders, & info. sheet scanned into media.

## 2021-07-07 ENCOUNTER — HOSPITAL ENCOUNTER (OUTPATIENT)
Dept: NON INVASIVE DIAGNOSTICS | Age: 61
Discharge: HOME | End: 2021-07-07
Payer: COMMERCIAL

## 2021-07-07 VITALS
RESPIRATION RATE: 11 BRPM | OXYGEN SATURATION: 98 % | SYSTOLIC BLOOD PRESSURE: 101 MMHG | DIASTOLIC BLOOD PRESSURE: 68 MMHG | HEART RATE: 62 BPM

## 2021-07-07 LAB
EKG ATRIAL RATE: 192 BPM
EKG ATRIAL RATE: 60 BPM
EKG DIAGNOSIS: NORMAL
EKG DIAGNOSIS: NORMAL
EKG P AXIS: 62 DEGREES
EKG P-R INTERVAL: 170 MS
EKG Q-T INTERVAL: 354 MS
EKG Q-T INTERVAL: 404 MS
EKG QRS DURATION: 102 MS
EKG QRS DURATION: 98 MS
EKG QTC CALCULATION (BAZETT): 404 MS
EKG QTC CALCULATION (BAZETT): 413 MS
EKG R AXIS: 61 DEGREES
EKG R AXIS: 68 DEGREES
EKG T AXIS: 45 DEGREES
EKG T AXIS: 58 DEGREES
EKG VENTRICULAR RATE: 60 BPM
EKG VENTRICULAR RATE: 82 BPM

## 2021-07-07 PROCEDURE — 7100000001 HC PACU RECOVERY - ADDTL 15 MIN

## 2021-07-07 PROCEDURE — 92960 CARDIOVERSION ELECTRIC EXT: CPT

## 2021-07-07 PROCEDURE — 92960 CARDIOVERSION ELECTRIC EXT: CPT | Performed by: INTERNAL MEDICINE

## 2021-07-07 PROCEDURE — 93005 ELECTROCARDIOGRAM TRACING: CPT | Performed by: INTERNAL MEDICINE

## 2021-07-07 PROCEDURE — 93312 ECHO TRANSESOPHAGEAL: CPT | Performed by: INTERNAL MEDICINE

## 2021-07-07 PROCEDURE — 93312 ECHO TRANSESOPHAGEAL: CPT

## 2021-07-07 PROCEDURE — 7100000000 HC PACU RECOVERY - FIRST 15 MIN

## 2021-07-07 PROCEDURE — 93010 ELECTROCARDIOGRAM REPORT: CPT | Performed by: INTERNAL MEDICINE

## 2021-07-07 RX ORDER — PROPRANOLOL HCL 60 MG
60 CAPSULE, EXTENDED RELEASE 24HR ORAL DAILY
Qty: 90 CAPSULE | Refills: 3 | Status: SHIPPED | OUTPATIENT
Start: 2021-07-07 | End: 2021-07-19

## 2021-07-07 NOTE — PROCEDURES
Indication: Atrial fibrillation      After obtaining the informed consent pt was sedated  With  Versed 5mg and  Fentanyl   100mcg  . A   200J synchronised  shock was given. Pt converted to  Sinus rythym       He was sedated for 8 minutes  Pt remained clinically and hemodynamically stable. ERIC before procedure did not show any atrial or appendage clot Cont with present medical therapy.   Continue Xarelto  Reduce propranolol to 40 mg daily  Continue Rythmol    I:  Successful cardioversion    Plan:  Cont present therapy  F/U in 2 weeks with Dr. Lorenza Carreno and Dr. Uriel Spain for possible ablation

## 2021-07-07 NOTE — H&P
Ann Pacheco, 63 y.o., male    Primary care physician:  Matilda Luevano     Chief Complaint: Atrial fibrillation    History of Present Illness:  He sees Dr. Dany Mccormick he comes in for ERIC cardioversion recently seen in the office started on Rythmol and anticoagulation Xarelto  He has been cardioverted 3 months ago but appears to have gone back into A. fib with symptoms    Past medical history:    has a past medical history of Atrial fibrillation ([address] Familial tremor, Family history of coronary artery disease, H/O 24 hour EKG monitoring, H/O cardiovascular stress test, H/O echocardiogram, H/O transesophageal echocardiography (ERIC) for monitoring, History of Doppler echocardiogram, History of exercise stress test, HTN (hypertension), Pre-op evaluation, and Ulcerative colitis ([address] Past surgical history:   has a past surgical history that includes colectomy and proctectomy. Social History:   reports that he quit smoking about 20 years ago. He has never used smokeless tobacco. He reports current alcohol use. He reports that he does not use drugs. Family history:  family history includes Birth Defects in his maternal grandmother; Heart Disease in his father and mother; High Blood Pressure in his mother. Allergies:    No Known Allergies    Home Medications:  Prior to Admission medications    Medication Sig Start Date End Date Taking? Authorizing Provider   rivaroxaban (XARELTO) 20 MG TABS tablet Take 1 tablet by mouth daily (with breakfast) 7/1/21  Yes WILBER Ayala CNP   propafenone (RYTHMOL SR) 225 MG extended release capsule Take 1 capsule by mouth 2 times daily 6/21/21  Yes Giovani Phillips MD   lisinopril (PRINIVIL;ZESTRIL) 5 MG tablet Take 1 tablet by mouth daily. 12/4/12  Yes Giovani Phillips MD   citalopram (CELEXA) 20 MG tablet Take 20 mg by mouth daily. Yes Historical Provider, MD   Multiple Vitamin (MULTIVITAMIN PO) Take 1 tablet by mouth daily.      Yes Historical Provider, MD propranolol (INDERAL LA) 80 MG CR capsule Take 80 mg by mouth daily. Yes Historical Provider, MD   apixaban (ELIQUIS) 5 MG TABS tablet Take 1 tablet by mouth 2 times daily  Patient not taking: Reported on 7/1/2021 3/30/21   Keila Banda MD       Review of systems: Review of Systems:   · Constitutional: No Fever or Weight Loss   · Eyes: No Decreased Vision  · ENT: No Headaches, Hearing Loss or Vertigo  · Cardiovascular: No chest pain, dyspnea on exertion, palpitations or loss of consciousness  · Respiratory: No cough or wheezing    · Gastrointestinal: No abdominal pain, appetite loss, blood in stools, constipation, diarrhea or heartburn  · Genitourinary: No dysuria, trouble voiding, or hematuria  · Musculoskeletal:  No gait disturbance, weakness or joint complaints  · Integumentary: No rash or pruritis  · Neurological: No TIA or stroke symptoms  · Psychiatric: No anxiety or depression  · Endocrine: No malaise, fatigue or temperature intolerance  · Hematologic/Lymphatic: No bleeding problems, blood clots or swollen lymph nodes  Allergic/Immunologic: No nasal congestion or hives    Physical Examination:    /76   Pulse 51   Resp 11   SpO2 98%      General Appearance:  No distress, conversant  Constitutional:  Well developed, Well nourished, No acute distress, Non-toxic appearance. HENT:  Normocephalic, Atraumatic, Bilateral external ears normal, Oropharynx moist, No oral exudates, Nose normal. Neck- Normal range of motion, No tenderness, Supple, No stridor,no apical-carotid delay  Eyes:  PERRL, EOMI, Conjunctiva normal, No discharge. Lymphatics: no palpable lymph nodes  Respiratory:  Normal breath sounds, No respiratory distress, No wheezing, No chest tenderness. ,no uise of accessory muscles, JVP not elevated  Cardiovascular: (PMI) apex non displaced,no lifts no thrills, no s3,no s4, Normal heart rate, Normal rhythm, No murmurs, No rubs, No gallops.    GI:  Bowel sounds normal, Soft, No tenderness, No masses, No pulsatile masses, no hepatosplenomegally, no bruits  Musculoskeletal:  Intact distal pulses, No edema, No tenderness, No cyanosis, No clubbing. Good range of motion in all major joints. No tenderness to palpation or major deformities noted. Back- No tenderness. Integument:  Warm, Dry, No erythema, No rash. Skin: no rash, no ulcers  Lymphatic:  No lymphadenopathy noted. Neurologic:  Alert & oriented x 3, Normal motor function, Normal sensory function, No focal deficits noted. Lab Review   No results for input(s): WBC, HGB, HCT, PLT in the last 72 hours. Recent Labs     07/06/21  1556      K 4.4      CO2 27   BUN 17   CREATININE 0.8*     No results for input(s): AST, ALT, ALB, BILIDIR, BILITOT, ALKPHOS in the last 72 hours. No results for input(s): TROPONINI in the last 72 hours. Lab Results   Component Value Date    INR 1.10 03/01/2021    PROTIME 13.3 03/01/2021     Lab Results   Component Value Date    BNP 19 07/22/2012         Assessment:    Active Problems:    * No active hospital problems. *     ASA : 2 , Mallampati class II  Plan:   1.  A. fib plan for ERIC cardioversion a: Alternate and risks versus benefits were discussed in detail.

## 2021-07-19 ENCOUNTER — OFFICE VISIT (OUTPATIENT)
Dept: CARDIOLOGY CLINIC | Age: 61
End: 2021-07-19
Payer: COMMERCIAL

## 2021-07-19 VITALS
HEIGHT: 75 IN | DIASTOLIC BLOOD PRESSURE: 80 MMHG | WEIGHT: 223 LBS | HEART RATE: 52 BPM | SYSTOLIC BLOOD PRESSURE: 130 MMHG | BODY MASS INDEX: 27.73 KG/M2

## 2021-07-19 DIAGNOSIS — I48.91 ATRIAL FIBRILLATION WITH CONTROLLED VENTRICULAR RESPONSE (HCC): Primary | ICD-10-CM

## 2021-07-19 PROCEDURE — 93000 ELECTROCARDIOGRAM COMPLETE: CPT | Performed by: INTERNAL MEDICINE

## 2021-07-19 PROCEDURE — 99213 OFFICE O/P EST LOW 20 MIN: CPT | Performed by: INTERNAL MEDICINE

## 2021-07-19 RX ORDER — PROPRANOLOL HCL 60 MG
60 CAPSULE, EXTENDED RELEASE 24HR ORAL DAILY
Qty: 90 CAPSULE | Refills: 3 | Status: SHIPPED | OUTPATIENT
Start: 2021-07-19

## 2021-07-19 NOTE — LETTER
Patient Name: Guille Ricardo  : 1960  MRN# [id number]    REASON FOR VISIT:       Current Outpatient Medications   Medication Sig Dispense Refill    propranolol (INDERAL LA) 60 MG extended release capsule Take 1 capsule by mouth daily 90 capsule 3    rivaroxaban (XARELTO) 20 MG TABS tablet Take 1 tablet by mouth daily (with breakfast) 30 tablet 1    propafenone (RYTHMOL SR) 225 MG extended release capsule Take 1 capsule by mouth 2 times daily 180 capsule 1    apixaban (ELIQUIS) 5 MG TABS tablet Take 1 tablet by mouth 2 times daily (Patient not taking: Reported on 2021) 60 tablet 5    lisinopril (PRINIVIL;ZESTRIL) 5 MG tablet Take 1 tablet by mouth daily. 30 tablet 6    citalopram (CELEXA) 20 MG tablet Take 20 mg by mouth daily.  Multiple Vitamin (MULTIVITAMIN PO) Take 1 tablet by mouth daily.  propranolol (INDERAL LA) 80 MG CR capsule Take 80 mg by mouth daily. No current facility-administered medications for this visit. Smoke: What:                           How much:    Alcohol: How Much:     Caffeine: Pop:         Tea:            Coffee:                Chocolate:    Exercise:    Labs: Lipids:             CBC:       BMP/CMP:          TSH:              A1C:    Last Visit:  Complaints:  Changes:    Last EKG:          Echocardiogram transesophageal: 2021  There was no thrombus noted in the left atrial appendage. Negative bubble study. No PFO or ASD noted. No significant valvular disease noted. Successful cardioversion using 200 joules. STRESS TEST:  10/2017  Excellent exercise capacity. 11 METs work load.    Physiological BP response to exercise.    ETT non diagnostic as THR is not achieved even at high work load    ECHO: 10/2017  Normal left ventricle structure and function. Ejection fraction is low normal, visually estimated at 50-55%. No significant valvular disease noted. No evidence of pericardial effusion. Unremarkable echo    CAROTID: NONE    MUGA: NONE    LAST PACER CHECK: NONE    CARDIAC CATH: NONE    Amio Protocol:    CHADS: [id number] Score for Atrial Fibrillation Stroke Risk   Risk   Factors  Component Value   C CHF No 0   H HTN Yes 1   A2 Age >= 76 No,  (61 y.o.) 0   D DM No 0   S2 Prior Stroke/TIA No 0   V Vascular Disease No 0   A Age 74-69 No,  (61 y.o.) 0   Sc Sex male 0    [id number]  Score  1   Score last updated 7/19/21 45:16 AM EDT    Click here for a link to the UpToDate guideline \"Atrial Fibrillation: Anticoagulation therapy to prevent embolization    Disclaimer: Risk Score calculation is dependent on accuracy of patient problem list and past encounter diagnosis.

## 2021-07-19 NOTE — PROGRESS NOTES
OFFICE PROGRESS NOTES      Fabrice Solomon is a 63 y.o. male who has    CHIEF COMPLAINT AS FOLLOWS:  CHEST PAIN: Patient denies any C/O chest pains at this time.      SOB: No C/O SOB at this time.              LEG EDEMA: No leg edema   PALPITATIONS: Denies any C/O Palpitations   DIZZINESS: No C/O Dizziness   SYNCOPE: None   OTHER                                    HPI: Patient is here for F/U on his PAF-Arrhythmia & HTN problems. Arrhythmia: Patient has known Hx of PAF. HTN: Patient has known Hx of essential HTN. Has been treated with guideline recommended medical / physical/ diet therapy as stated below. He does not have any new complaints at this time. Current Outpatient Medications   Medication Sig Dispense Refill    propranolol (INDERAL LA) 60 MG extended release capsule Take 1 capsule by mouth daily 90 capsule 3    rivaroxaban (XARELTO) 20 MG TABS tablet Take 1 tablet by mouth daily (with breakfast) 30 tablet 1    propafenone (RYTHMOL SR) 225 MG extended release capsule Take 1 capsule by mouth 2 times daily 180 capsule 1    apixaban (ELIQUIS) 5 MG TABS tablet Take 1 tablet by mouth 2 times daily 60 tablet 5    lisinopril (PRINIVIL;ZESTRIL) 5 MG tablet Take 1 tablet by mouth daily. 30 tablet 6    citalopram (CELEXA) 20 MG tablet Take 20 mg by mouth daily.  Multiple Vitamin (MULTIVITAMIN PO) Take 1 tablet by mouth daily. No current facility-administered medications for this visit. Allergies: Patient has no known allergies.   Review of Systems:    Constitutional: Negative for diaphoresis and fatigue  Respiratory: Negative for shortness of breath  Cardiovascular: Negative for chest pain, dyspnea on exertion, claudication, edema, irregular heartbeat, murmur, palpitations or shortness of breath  Musculoskeletal: Negative for muscle pain, muscular weakness, negative for pain in arm and leg or swelling in foot and leg    Objective:  /80   Pulse 52   Ht 6' 3\" (1.905 m) Wt 223 lb (101.2 kg)   BMI 27.87 kg/m²   Wt Readings from Last 3 Encounters:   07/19/21 223 lb (101.2 kg)   07/01/21 226 lb (102.5 kg)   06/07/21 222 lb (100.7 kg)     Body mass index is 27.87 kg/m². GENERAL - Alert, oriented, pleasant, in no apparent distress. EYES: No jaundice, no conjunctival pallor. Neck - Supple. No jugular venous distention noted. No carotid bruits. Cardiovascular  Normal S1 and S2 without obvious murmur or gallop. Extremities - No cyanosis, clubbing, or significant edema. Pulmonary  No respiratory distress. No wheezes or rales.       Lab Review   No results found for: TROPONINT  Lab Results   Component Value Date    BNP 19 07/22/2012     Lab Results   Component Value Date    INR 1.10 03/01/2021    INR 1.08 07/22/2012     No results found for: LABA1C  Lab Results   Component Value Date    WBC 6.9 07/23/2012    WBC 7.4 07/22/2012    HCT 45.9 07/23/2012    HCT 46.3 07/22/2012    MCV 91.4 07/23/2012    MCV 90.9 07/22/2012     07/23/2012     07/22/2012     Lab Results   Component Value Date    CHOL 176 09/14/2011    TRIG 55 09/14/2011    HDL 56 (L) 09/14/2011    LDLDIRECT 122 (H) 09/14/2011     Lab Results   Component Value Date    ALT 22 07/22/2012    ALT 28 06/19/2012    AST 32 07/22/2012    AST 33 06/19/2012     BMP:    Lab Results   Component Value Date     07/06/2021     03/01/2021    K 4.4 07/06/2021    K 4.6 03/01/2021     07/06/2021     03/01/2021    CO2 27 07/06/2021    CO2 26 03/01/2021    BUN 17 07/06/2021    BUN 12 03/01/2021    CREATININE 0.8 07/06/2021    CREATININE 1.0 03/01/2021     CMP:   Lab Results   Component Value Date     07/06/2021     03/01/2021    K 4.4 07/06/2021    K 4.6 03/01/2021     07/06/2021     03/01/2021    CO2 27 07/06/2021    CO2 26 03/01/2021    BUN 17 07/06/2021    BUN 12 03/01/2021    CREATININE 0.8 07/06/2021    CREATININE 1.0 03/01/2021    PROT 7.1 07/22/2012    PROT 6.9 06/19/2012 Lab Results   Component Value Date    TSH 1.328 07/22/2012    TSHHS 0.913 06/19/2012     ETT 10/2017   Excellent exercise capacity. 11 METs work load.    Physiological BP response to exercise.    ETT non diagnostic as THR is not achieved even at high work load.      ECHO 10/2017   Normal left ventricle structure and function.   Ejection fraction is low normal, visually estimated at 50-55%.   No significant valvular disease noted.   No evidence of pericardial effusion.   Unremarkable echo. TE-Echo 7/2021   There was no thrombus noted in the left atrial appendage. Negative bubble study. No PFO or ASD noted. No significant valvular disease noted. Successful cardioversion using 200 joules. EKG: Sinus Vj 52/min. QTc 398    QUALITY MEASURES REVIEWED:  1.CAD:Patient is taking anti platelet agent:No  Patient does not have Hx of documented CAD  2. DYSLIPIDEMIA: Patient is on cholesterol lowering medication:No .  3.Beta-Blocker therapy for CAD, if prior Myocardial Infarction:Yes   4. Counselled regarding smoking cessation. No   Patient does not Smoke. 5.Anticoagulation therapy (for A.Fib) Yes  6. Discussed weight management strategies. Assessment & Plan:    Primary / Secondary prevention is the goal by aggressive risk modification, healthy and therapeutic life style changes for cardiovascular risk reduction. Various goals are discussed and multiple questions answered. CAD:None known  HTN:Yes  well controlled on current medical regimen, see list above. - changes in  treatment:   no   CARDIOMYOPATHY:No  Psychiatric  VHD:no              LR significant VHD noted  DYSLIPIDEMIA: no  ARRHYTHMIAS:yes, CHADS is 1  Atrial fibrillation S/P Cardioversion again. He is rate controlled & on anticoagulation. CHADS score is only one. Stay on Rythmol. Patient to see Ad Marmolejo for possible ablation because of recurrent episodes of PAF.   OTHER RELEVANT DIAGNOSIS:as noted in patient's active problem list:  TESTS ORDERED:   None this visit. All previously ordered tests reviewed.  MEDICATIONS: CPM. Propranolol is for tremors. Office f/u in 3 months.

## 2021-07-19 NOTE — LETTER
[place]    Dr. Chavez [facility]  1960  [id number]    Have you had any Chest Pain that is not new? - No      Have you had any Shortness of Breath - No  If Yes - When     Have you had any dizziness - No    Have you had any palpitations that are not new? - No    Is the patient on any of the following medications -   If Yes DO EKG - Needs done every 6 months    Do you have any edema - swelling in No      When did you have your last labs drawn   Where did you have them done   What doctor ordered     If we do not have these labs you are retrieve these labs for these providers!     Do you have a surgery or procedure scheduled in the near future - No     Ask patient if they want to sign up for MyChart if they are not already signed up     Check to see if we have an E-MAIL on file for the patient     Check medication list thoroughly!!! AND RECONCILE OUTSIDE MEDICATIONS  If dose has changed change the entire order not just the MG  BE SURE TO ASK PATIENT IF THEY NEED MEDICATION REFILLS     At check out add to every patient's \"wrap up\" the following dot phrase AFTERHOURSEDUCATION and ensure we explain this to our patients

## 2021-07-19 NOTE — PROGRESS NOTES
[id number] Score for Atrial Fibrillation Stroke Risk   Risk   Factors  Component Value   C CHF No 0   H HTN Yes 1   A2 Age >= 76 No,  (61 y.o.) 0   D DM No 0   S2 Prior Stroke/TIA No 0   V Vascular Disease No 0   A Age 74-69 No,  (61 y.o.) 0   Sc Sex male 0    [id number]  Score  1   Score last updated 7/19/21 5:87 PM EDT    Click here for a link to the UpToDate guideline \"Atrial Fibrillation: Anticoagulation therapy to prevent embolization    Disclaimer: Risk Score calculation is dependent on accuracy of patient problem list and past encounter diagnosis.

## 2021-07-19 NOTE — PATIENT INSTRUCTIONS
CAD:None known  HTN:Yes  well controlled on current medical regimen, see list above. - changes in  treatment:   no   CARDIOMYOPATHY:No  [facility]  VHD:no               significant VHD noted  DYSLIPIDEMIA: no  ARRHYTHMIAS:yes, CHADS is 1  Atrial fibrillation S/P Cardioversion again. He is rate controlled & on anticoagulation. CHADS score is only one. Stay on Rythmol. Patient to see Breann Jenniffer for possible ablation because of recurrent episodes of PAF. OTHER RELEVANT DIAGNOSIS:as noted in patient's active problem list:  TESTS ORDERED:   None this visit. All previously ordered tests reviewed.  MEDICATIONS: CPM. Propranolol is for tremors. Office f/u in 3 months. **It is YOUR responsibilty to bring medication bottles and/or updated medication list to [address] [place] [address]. This will allow us to better serve you and all your healthcare needs**    Please be informed that if you contact our office outside of normal business hours the physician on call cannot help with any scheduling or rescheduling issues, procedure instruction questions or any type of medication issue. We advise you for any urgent/emergency that you go to the nearest emergency room!     PLEASE CALL OUR OFFICE DURING NORMAL BUSINESS HOURS    Monday - Friday   8 am to 5 pm    [place]: [address] [address]: [phone]    [place]:  [phone]

## 2021-07-23 ENCOUNTER — TELEPHONE (OUTPATIENT)
Dept: CARDIOLOGY CLINIC | Age: 61
End: 2021-07-23

## 2021-07-31 ASSESSMENT — ENCOUNTER SYMPTOMS
COUGH: 0
SHORTNESS OF BREATH: 1

## 2021-09-03 ENCOUNTER — INITIAL CONSULT (OUTPATIENT)
Dept: CARDIOLOGY CLINIC | Age: 61
End: 2021-09-03
Payer: COMMERCIAL

## 2021-09-03 VITALS
HEART RATE: 56 BPM | BODY MASS INDEX: 28.72 KG/M2 | SYSTOLIC BLOOD PRESSURE: 110 MMHG | DIASTOLIC BLOOD PRESSURE: 64 MMHG | WEIGHT: 231 LBS | HEIGHT: 75 IN

## 2021-09-03 DIAGNOSIS — I48.0 PAF (PAROXYSMAL ATRIAL FIBRILLATION) (HCC): Primary | ICD-10-CM

## 2021-09-03 DIAGNOSIS — I48.91 ATRIAL FIBRILLATION WITH CONTROLLED VENTRICULAR RESPONSE (HCC): ICD-10-CM

## 2021-09-03 PROCEDURE — 99204 OFFICE O/P NEW MOD 45 MIN: CPT | Performed by: INTERNAL MEDICINE

## 2021-09-03 PROCEDURE — 93000 ELECTROCARDIOGRAM COMPLETE: CPT | Performed by: INTERNAL MEDICINE

## 2021-09-03 RX ORDER — ASPIRIN 81 MG/1
81 TABLET ORAL DAILY
Qty: 90 TABLET | Refills: 1 | Status: ON HOLD | OUTPATIENT
Start: 2021-09-03 | End: 2021-10-15 | Stop reason: HOSPADM

## 2021-09-03 ASSESSMENT — ENCOUNTER SYMPTOMS
VOMITING: 0
BLOOD IN STOOL: 0
DIARRHEA: 0
CONSTIPATION: 0
EYE PAIN: 0
WHEEZING: 0
COUGH: 0
ABDOMINAL PAIN: 0
CHEST TIGHTNESS: 0
BACK PAIN: 0
NAUSEA: 0
COLOR CHANGE: 0
PHOTOPHOBIA: 0
SHORTNESS OF BREATH: 0

## 2021-09-03 NOTE — PROGRESS NOTES
Electrophysiology Consult Note      Reason for consultation:  Atrial fibrillation    Chief complaint : Atrial fibrillation    Referring physician:       Primary care physician: Benoit Pulido      History of Present Illness:     Patient is a 76-year-old male with a history of hypertension, paroxysmal atrial fibrillation was referred to the office by Dr. Cindi Chacko for atrial fibrillation management. Patient reports he has had on and off A. fib for quite some time and had been cardioverted several times up to 5-6 times and he has been on Rythmol and he had breakthrough episodes. When he is in atrial fibrillation patient does not feel good and he has tiredness and weakness and short of breath and he is not able to do much work. When he is not in A. fib he feels good. Patient denies any chest pain, palpitations, shortness of breath, edema or dizziness. Patient drinks decaf coffee. Patient denies alcohol        Pastmedical history:   Past Medical History:   Diagnosis Date    Atrial fibrillation [facility]) 2006 7/23/12 Successful direct current cardioversion    Encounter for cardioversion procedure 07/06/2021    sucessful    Familial tremor     Family history of coronary artery disease     H/O 24 hour EKG monitoring 04/06/2006    The 48 holter monitor reveals the patient in SR with rare ventricular ectopic beat and rare supraventricular ectopic beat.  H/O cardiovascular stress test 06/20/2012    Probably normal perfusion Lexiscan Cardiolite study except for diaphragmatic artifact. Low-normal LV function, EF 50%    H/O echocardiogram 06/20/2012    Mild left atrial enlargement and right ventricular enlargement. Left ventricular hypertrophy with global hypokinesis and moderately reduced LV function. EF 35-40%  Mild pulmomary HTN.   Mild TR    H/O transesophageal echocardiography ([facility]) for monitoring 06/20/2012    Other than mild MR and TR, no other significant abnormalities seen. There is no intracavitary mass or thrombus, there is no atrial septal defect or patent foramen ovale    History of Doppler echocardiogram 10/05/2017    Normal left ventricle structure and function. Ejection fraction is low normal, visually estimated at 50-55%. No significant valvular disease noted. No evidence of pericardial effusion. Unremarkable echo.  History of exercise stress test 10/05/2017    treadmill    History of transesophageal echocardiography ([facility]) 07/06/2021    No thrombus noted in the left atrial appendage. Negative bubble study. No PFO or ASD noted.  HTN (hypertension)     Pre-op evaluation     Ulcerative colitis ([address]        Surgical history :   Past Surgical History:   Procedure Laterality Date    COLECTOMY      PROCTECTOMY         Family history:   Family History   Problem Relation Age of Onset    Heart Disease Mother     High Blood Pressure Mother     Heart Disease Father     Birth Defects Maternal Grandmother        Social history :  reports that he quit smoking about 20 years ago. He has never used smokeless tobacco. He reports current alcohol use. He reports that he does not use drugs. No Known Allergies    Current Outpatient Medications on File Prior to Visit   Medication Sig Dispense Refill    propranolol (INDERAL LA) 60 MG extended release capsule Take 1 capsule by mouth daily 90 capsule 3    propafenone (RYTHMOL SR) 225 MG extended release capsule Take 1 capsule by mouth 2 times daily 180 capsule 1    lisinopril (PRINIVIL;ZESTRIL) 5 MG tablet Take 1 tablet by mouth daily. 30 tablet 6    citalopram (CELEXA) 20 MG tablet Take 20 mg by mouth daily.  Multiple Vitamin (MULTIVITAMIN PO) Take 1 tablet by mouth daily.  rivaroxaban (XARELTO) 20 MG TABS tablet Take 1 tablet by mouth daily (with breakfast) (Patient not taking: Reported on 9/3/2021) 30 tablet 1     No current facility-administered medications on file prior to visit. Review of Systems:   Review of Systems   Constitutional: Positive for fatigue. Negative for activity change, chills and fever. HENT: Negative for congestion, ear pain and tinnitus. Eyes: Negative for photophobia, pain and visual disturbance. Respiratory: Negative for cough, chest tightness, shortness of breath and wheezing. Cardiovascular: Negative for chest pain, palpitations and leg swelling. Gastrointestinal: Negative for abdominal pain, blood in stool, constipation, diarrhea, nausea and vomiting. Endocrine: Negative for cold intolerance and heat intolerance. Genitourinary: Negative for dysuria, flank pain and hematuria. Musculoskeletal: Positive for arthralgias. Negative for back pain, myalgias and neck stiffness. Skin: Negative for color change and rash. Allergic/Immunologic: Negative for food allergies. Neurological: Negative for dizziness, light-headedness, numbness and headaches. Hematological: Does not bruise/bleed easily. Psychiatric/Behavioral: Negative for agitation, behavioral problems and confusion. Examination:      Vitals:    09/03/21 1244   BP: 110/64   Site: Right Upper Arm   Position: Sitting   Cuff Size: Medium Adult   Pulse: 56   Weight: 231 lb (104.8 kg)   Height: 6' 3\" (1.905 m)        Body mass index is 28.87 kg/m². Physical Exam  Constitutional:       Appearance: Normal appearance. He is not ill-appearing. HENT:      Head: Normocephalic and atraumatic. Mouth/Throat:      Mouth: Mucous membranes are moist.   Eyes:      Conjunctiva/sclera: Conjunctivae normal.   Cardiovascular:      Rate and Rhythm: Normal rate and regular rhythm. Heart sounds: No murmur heard. Pulmonary:      Effort: Pulmonary effort is normal.      Breath sounds: No rales. Abdominal:      General: Abdomen is flat. Palpations: Abdomen is soft. Musculoskeletal:         General: No tenderness. Normal range of motion.       Cervical back: Normal range of motion. Right lower leg: No edema. Left lower leg: No edema. Skin:     General: Skin is warm and dry. Neurological:      General: No focal deficit present. Mental Status: He is alert and oriented to person, place, and time. CBC:   Lab Results   Component Value Date    WBC 6.9 07/23/2012    HGB 15.3 07/23/2012    HCT 45.9 07/23/2012     07/23/2012     Lipids:  Lab Results   Component Value Date    CHOL 176 09/14/2011    TRIG 55 09/14/2011    HDL 56 (L) 09/14/2011    LDLDIRECT 122 (H) 09/14/2011     PT/INR:   Lab Results   Component Value Date    INR 1.10 03/01/2021        BMP:    Lab Results   Component Value Date     07/06/2021    K 4.4 07/06/2021     07/06/2021    CO2 27 07/06/2021    BUN 17 07/06/2021     CMP:   Lab Results   Component Value Date    AST 32 07/22/2012    PROT 7.1 07/22/2012    BILITOT 0.8 07/22/2012    ALKPHOS 61 07/22/2012     TSH:  No results found for: TSH    EKGINTERPRETATION - EKG Interpretation:  Sinus bradycardia      IMPRESSION / RECOMMENDATIONS:     Atrial fibrillation paroxysmal  Hypertension on lisinopril  History of ulcerative colitis  Family history of coronary artery disease  Essential tremors on propafenone      Patient with recurrent PAF episodes. Patient had multiple cardioversions 5-6 times - he reports. Patient has been on Rythmol and still has breakthrough episodes. Patient reports that he feels down and unable to do much when he is in atrial fibrillation and feels tired and weak at that time but when he is not in A. fib is fine. Patient is not on any anticoagulation as his chads vas score is only 1 and is only on aspirin currently.   Discussed with the patient about pathophysiology of atrial fibrillation in detail and discussed with the patient about A. fib ablation    The risks including, but not limited to, vascular injury, bleeding, infection, radiation exposure, injury to cardiac and surrounding structures (including esophageal and pulmonary vein injury), injury to the normal conduction system of the heart (possibly requiring a pacemaker), stroke, myocardial infarction and death were all discussed. The patient considered the risks, benefits and alternatives; decided to proceed with the procedure. Discussed with the patient quit alcohol and caffeine. Thanks again for allowing me to participate in care of this patient. Please call me if you have any questions. With best regards. Valeri Osborne MD, 9/3/2021 12:50 PM     Please note this report has been partially produced using speech recognition software and may contain errors related to that system including errors in grammar, punctuation, and spelling, as well as words and phrases that may be inappropriate. If there are any questions or concerns please feel free to contact the dictating provider for clarification.

## 2021-09-03 NOTE — PATIENT INSTRUCTIONS
COVID test, pre-testing, and sign consent form on 10/11/21 at the [facility]. Procedure scheduled with Kristin Maldonado on 10/14/21 at 0730, arrive at [facility].

## 2021-09-08 ENCOUNTER — TELEPHONE (OUTPATIENT)
Dept: CARDIOLOGY CLINIC | Age: 61
End: 2021-09-08

## 2021-09-08 NOTE — TELEPHONE ENCOUNTER
Called and informed patient the date and time of CT Cardiac [facility] Card Only scheduled 9/13/21 at 10am [facility] CT CARDIAC [facility]  PREPS:   Arrive 30 minutes prior to scheduled time   NPO after midnight

## 2021-09-13 ENCOUNTER — HOSPITAL ENCOUNTER (OUTPATIENT)
Dept: CT IMAGING | Age: 61
Discharge: HOME | End: 2021-09-13
Payer: COMMERCIAL

## 2021-09-13 DIAGNOSIS — I48.0 PAF (PAROXYSMAL ATRIAL FIBRILLATION) (HCC): ICD-10-CM

## 2021-09-13 PROCEDURE — 6360000004 HC RX CONTRAST MEDICATION: Performed by: INTERNAL MEDICINE

## 2021-09-13 PROCEDURE — 75574 CT ANGIO HRT W/3D IMAGE: CPT | Performed by: INTERNAL MEDICINE

## 2021-09-13 PROCEDURE — 75574 CT ANGIO HRT W/3D IMAGE: CPT

## 2021-09-13 RX ADMIN — IOPAMIDOL 155 ML: 755 INJECTION, SOLUTION INTRAVENOUS at 10:39

## 2021-10-11 ENCOUNTER — HOSPITAL ENCOUNTER (OUTPATIENT)
Age: 61
Discharge: HOME | End: 2021-10-11
Payer: COMMERCIAL

## 2021-10-11 ENCOUNTER — HOSPITAL ENCOUNTER (OUTPATIENT)
Age: 61
Setting detail: SPECIMEN
Discharge: HOME | End: 2021-10-11
Payer: COMMERCIAL

## 2021-10-11 ENCOUNTER — HOSPITAL ENCOUNTER (OUTPATIENT)
Dept: GENERAL RADIOLOGY | Age: 61
Discharge: HOME | End: 2021-10-11
Payer: COMMERCIAL

## 2021-10-11 ENCOUNTER — NURSE ONLY (OUTPATIENT)
Dept: CARDIOLOGY CLINIC | Age: 61
End: 2021-10-11
Payer: COMMERCIAL

## 2021-10-11 VITALS — DIASTOLIC BLOOD PRESSURE: 78 MMHG | SYSTOLIC BLOOD PRESSURE: 112 MMHG

## 2021-10-11 DIAGNOSIS — I48.0 PAROXYSMAL ATRIAL FIBRILLATION (HCC): ICD-10-CM

## 2021-10-11 DIAGNOSIS — Z01.818 PRE-PROCEDURAL EXAMINATION: ICD-10-CM

## 2021-10-11 LAB
ANION GAP SERPL CALCULATED.3IONS-SCNC: 8 MMOL/L (ref 4–16)
APTT: 27.4 SECONDS (ref 25.1–37.1)
BASOPHILS ABSOLUTE: 0 K/CU MM
BASOPHILS RELATIVE PERCENT: 0.6 % (ref 0–1)
BUN BLDV-MCNC: 14 MG/DL (ref 6–23)
CALCIUM SERPL-MCNC: 9.1 MG/DL (ref 8.3–10.6)
CHLORIDE BLD-SCNC: 105 MMOL/L (ref 99–110)
CO2: 27 MMOL/L (ref 21–32)
CREAT SERPL-MCNC: 0.9 MG/DL (ref 0.9–1.3)
DIFFERENTIAL TYPE: ABNORMAL
EOSINOPHILS ABSOLUTE: 0.1 K/CU MM
EOSINOPHILS RELATIVE PERCENT: 1.3 % (ref 0–3)
GFR AFRICAN AMERICAN: >60 ML/MIN/1.73M2
GFR NON-AFRICAN AMERICAN: >60 ML/MIN/1.73M2
GLUCOSE BLD-MCNC: 106 MG/DL (ref 70–99)
HCT VFR BLD CALC: 43.4 % (ref 42–52)
HEMOGLOBIN: 14.3 GM/DL (ref 13.5–18)
IMMATURE NEUTROPHIL %: 0.7 % (ref 0–0.43)
INR BLD: 0.95 INDEX
LYMPHOCYTES ABSOLUTE: 1.7 K/CU MM
LYMPHOCYTES RELATIVE PERCENT: 24.1 % (ref 24–44)
MAGNESIUM: 1.9 MG/DL (ref 1.8–2.4)
MCH RBC QN AUTO: 31.2 PG (ref 27–31)
MCHC RBC AUTO-ENTMCNC: 32.9 % (ref 32–36)
MCV RBC AUTO: 94.8 FL (ref 78–100)
MONOCYTES ABSOLUTE: 0.7 K/CU MM
MONOCYTES RELATIVE PERCENT: 9.7 % (ref 0–4)
NUCLEATED RBC %: 0 %
PDW BLD-RTO: 12.2 % (ref 11.7–14.9)
PHOSPHORUS: 3 MG/DL (ref 2.5–4.9)
PLATELET # BLD: 149 K/CU MM (ref 140–440)
PMV BLD AUTO: 11.2 FL (ref 7.5–11.1)
POTASSIUM SERPL-SCNC: 4.1 MMOL/L (ref 3.5–5.1)
PROTHROMBIN TIME: 12.3 SECONDS (ref 11.7–14.5)
RBC # BLD: 4.58 M/CU MM (ref 4.6–6.2)
SEGMENTED NEUTROPHILS ABSOLUTE COUNT: 4.4 K/CU MM
SEGMENTED NEUTROPHILS RELATIVE PERCENT: 63.6 % (ref 36–66)
SODIUM BLD-SCNC: 140 MMOL/L (ref 135–145)
TOTAL IMMATURE NEUTOROPHIL: 0.05 K/CU MM
TOTAL NUCLEATED RBC: 0 K/CU MM
WBC # BLD: 6.9 K/CU MM (ref 4–10.5)

## 2021-10-11 PROCEDURE — 36415 COLL VENOUS BLD VENIPUNCTURE: CPT

## 2021-10-11 PROCEDURE — 85025 COMPLETE CBC W/AUTO DIFF WBC: CPT

## 2021-10-11 PROCEDURE — U0005 INFEC AGEN DETEC AMPLI PROBE: HCPCS

## 2021-10-11 PROCEDURE — 85730 THROMBOPLASTIN TIME PARTIAL: CPT

## 2021-10-11 PROCEDURE — U0003 INFECTIOUS AGENT DETECTION BY NUCLEIC ACID (DNA OR RNA); SEVERE ACUTE RESPIRATORY SYNDROME CORONAVIRUS 2 (SARS-COV-2) (CORONAVIRUS DISEASE [COVID-19]), AMPLIFIED PROBE TECHNIQUE, MAKING USE OF HIGH THROUGHPUT TECHNOLOGIES AS DESCRIBED BY CMS-2020-01-R: HCPCS

## 2021-10-11 PROCEDURE — 83735 ASSAY OF MAGNESIUM: CPT

## 2021-10-11 PROCEDURE — 99211 OFF/OP EST MAY X REQ PHY/QHP: CPT | Performed by: INTERNAL MEDICINE

## 2021-10-11 PROCEDURE — 80048 BASIC METABOLIC PNL TOTAL CA: CPT

## 2021-10-11 PROCEDURE — 71046 X-RAY EXAM CHEST 2 VIEWS: CPT

## 2021-10-11 PROCEDURE — 85610 PROTHROMBIN TIME: CPT

## 2021-10-11 PROCEDURE — 84100 ASSAY OF PHOSPHORUS: CPT

## 2021-10-11 NOTE — PROGRESS NOTES
Patient informed of instructions and guidance for performing test.  Throat swab performed. Swab placed into labeled collection tube. Collection tube and lab order placed in plastic bag. Bag placed in biohazard bag and placed in fridge.  called for . Patient here in office and educated on A-fib ablation, schedule for 10/14/21 @ [phone], with arrival @ 0730, @ [address]; risk explained; and consents signed. Also copy of orders given for labs and CXR due 10/11/21 at [facility]. Instruction given to patient to :  NPO after midnight the night before procedure; call hospital at [phone] to pre-register. May take rest of morning meds of procedure. Hold ALL blood pressure medications morning of procedure. Patient voiced understanding. Copies of consent & info scanned in chart.

## 2021-10-12 LAB
SARS-COV-2: NOT DETECTED
SOURCE: NORMAL

## 2021-10-13 ENCOUNTER — ANESTHESIA EVENT (OUTPATIENT)
Dept: CARDIAC CATH/INVASIVE PROCEDURES | Age: 61
End: 2021-10-13
Payer: COMMERCIAL

## 2021-10-13 NOTE — ANESTHESIA PRE PROCEDURE
Department of Anesthesiology  Preprocedure Note       Name:  Aretha Mtz   Age:  57 y.o.  :  1960                                          MRN:  [id number]         Date:  10/13/2021      Surgeon: * Surgery not found *    Procedure:     Medications prior to admission:   Prior to Admission medications    Medication Sig Start Date End Date Taking? Authorizing Provider   aspirin EC 81 MG EC tablet Take 1 tablet by mouth daily 9/3/21   Abbie Varela MD   propranolol (INDERAL LA) 60 MG extended release capsule Take 1 capsule by mouth daily 21   Jocelyn Ingram MD   propafenone (RYTHMOL SR) 225 MG extended release capsule Take 1 capsule by mouth 2 times daily 21   Jocelyn Ingram MD   lisinopril (PRINIVIL;ZESTRIL) 5 MG tablet Take 1 tablet by mouth daily. 12   Jocelyn Ingram MD   citalopram (CELEXA) 20 MG tablet Take 20 mg by mouth daily. Historical Provider, MD   Multiple Vitamin (MULTIVITAMIN PO) Take 1 tablet by mouth daily. Historical Provider, MD       Current medications:    Current Outpatient Medications   Medication Sig Dispense Refill    aspirin EC 81 MG EC tablet Take 1 tablet by mouth daily 90 tablet 1    propranolol (INDERAL LA) 60 MG extended release capsule Take 1 capsule by mouth daily 90 capsule 3    propafenone (RYTHMOL SR) 225 MG extended release capsule Take 1 capsule by mouth 2 times daily 180 capsule 1    lisinopril (PRINIVIL;ZESTRIL) 5 MG tablet Take 1 tablet by mouth daily. 30 tablet 6    citalopram (CELEXA) 20 MG tablet Take 20 mg by mouth daily.  Multiple Vitamin (MULTIVITAMIN PO) Take 1 tablet by mouth daily. No current facility-administered medications for this encounter.        Allergies:  No Known Allergies    Problem List:    Patient Active Problem List   Diagnosis Code    Atrial fibrillation with controlled ventricular response (HCC) I48.91    Ulcerative colitis, chronic (HCC) K51.90    HTN (hypertension) I10    Atrial fibrillation (HCC) I48.91    Ulcerative colitis ([facility] [address] K51.90    Familial tremor G25.0    Family history of coronary artery disease Z82.49       Past Medical History:        Diagnosis Date    Atrial fibrillation [facility]) 2006 7/23/12 Successful direct current cardioversion    Encounter for cardioversion procedure 07/06/2021    sucessful    Familial tremor     Family history of coronary artery disease     H/O 24 hour EKG monitoring 04/06/2006    The 48 holter monitor reveals the patient in SR with rare ventricular ectopic beat and rare supraventricular ectopic beat.  H/O cardiovascular stress test 06/20/2012    Probably normal perfusion Lexiscan Cardiolite study except for diaphragmatic artifact. Low-normal LV function, EF 50%    H/O echocardiogram 06/20/2012    Mild left atrial enlargement and right ventricular enlargement. Left ventricular hypertrophy with global hypokinesis and moderately reduced LV function. EF 35-40%  Mild pulmomary HTN. Mild TR    H/O transesophageal echocardiography (ERIC) for monitoring 06/20/2012    Other than mild MR and TR, no other significant abnormalities seen. There is no intracavitary mass or thrombus, there is no atrial septal defect or patent foramen ovale    History of Doppler echocardiogram 10/05/2017    Normal left ventricle structure and function. Ejection fraction is low normal, visually estimated at 50-55%. No significant valvular disease noted. No evidence of pericardial effusion. Unremarkable echo.  History of exercise stress test 10/05/2017    treadmill    History of transesophageal echocardiography (ERIC) 07/06/2021    No thrombus noted in the left atrial appendage. Negative bubble study. No PFO or ASD noted.     HTN (hypertension)     Pre-op evaluation     Ulcerative colitis ([facility] [address]        Past Surgical History:        Procedure Laterality Date    COLECTOMY      PROCTECTOMY         Social History:    Social History     Tobacco Use    Smoking status: Former Smoker     Quit date: 3/1/2001     Years since quittin.6    Smokeless tobacco: Never Used   Substance Use Topics    Alcohol use: Yes     Comment: 2-3 beers 2-3 times weekly                                Counseling given: Not Answered      Vital Signs (Current): There were no vitals filed for this visit. BP Readings from Last 3 Encounters:   10/11/21 112/78   21 110/64   21 130/80       NPO Status:                                                                                 BMI:   Wt Readings from Last 3 Encounters:   21 231 lb (104.8 kg)   21 223 lb (101.2 kg)   21 226 lb (102.5 kg)     There is no height or weight on file to calculate BMI.    CBC:   Lab Results   Component Value Date    WBC 6.9 10/11/2021    RBC 4.58 10/11/2021    HGB 14.3 10/11/2021    HCT 43.4 10/11/2021    MCV 94.8 10/11/2021    RDW 12.2 10/11/2021     10/11/2021       CMP:   Lab Results   Component Value Date     10/11/2021    K 4.1 10/11/2021     10/11/2021    CO2 27 10/11/2021    BUN 14 10/11/2021    CREATININE 0.9 10/11/2021    GFRAA >60 10/11/2021    LABGLOM >60 10/11/2021    GLUCOSE 106 10/11/2021    PROT 7.1 2012    CALCIUM 9.1 10/11/2021    BILITOT 0.8 2012    ALKPHOS 61 2012    AST 32 2012    ALT 22 2012       POC Tests: No results for input(s): POCGLU, POCNA, POCK, POCCL, POCBUN, POCHEMO, POCHCT in the last 72 hours.     Coags:   Lab Results   Component Value Date    PROTIME 12.3 10/11/2021    INR 0.95 10/11/2021    APTT 27.4 10/11/2021       HCG (If Applicable): No results found for: PREGTESTUR, PREGSERUM, HCG, HCGQUANT     ABGs: No results found for: PHART, PO2ART, FMF4JSG, KIF2TCE, BEART, I0HBPXCS     Type & Screen (If Applicable):  No results found for: LABABO, LABRH    Drug/Infectious Status (If Applicable):  No results found for: HIV, HEPCAB    COVID-19 Screening (If Applicable):   Lab Results Component Value Date    COVID19 NOT DETECTED 10/11/2021           Anesthesia Evaluation  Patient summary reviewed no history of anesthetic complications:   Airway: Mallampati: II  TM distance: >3 FB   Neck ROM: full  Mouth opening: > = 3 FB Dental:          Pulmonary:                             ROS comment: Former smoker   Cardiovascular:  Exercise tolerance: good (>4 METS),   (+) hypertension:, dysrhythmias: atrial fibrillation,       ECG reviewed  Rhythm: regular  Rate: normal  Echocardiogram reviewed         Beta Blocker:  Dose within 24 Hrs      ROS comment: Summary   There was no thrombus noted in the left atrial appendage. Negative bubble study. No PFO or ASD noted. No significant valvular disease noted. Successful cardioversion using 200 joules. Signature      ------------------------------------------------------------------   Electronically signed by Author Feliciano BRIDGES (Interpreting   physician) on 07/07/2021 at 03:06 PM     Neuro/Psych:   Negative Neuro/Psych ROS               ROS comment: Familial tremor GI/Hepatic/Renal:   (+) PUD,          ROS comment: Ulcerative colitis . Endo/Other: Negative Endo/Other ROS                    Abdominal:       Abdomen: soft. Vascular: negative vascular ROS. Other Findings:           Anesthesia Plan      general     ASA 3       Induction: intravenous. arterial line  MIPS: Postoperative opioids intended and Prophylactic antiemetics administered. Anesthetic plan and risks discussed with patient. Use of blood products discussed with patient whom consented to blood products. Plan discussed with CRNA and attending.     Attending anesthesiologist reviewed and agrees with Preprocedure content          WILBER Smiley - NATALIIA   10/13/2021

## 2021-10-14 ENCOUNTER — HOSPITAL ENCOUNTER (OUTPATIENT)
Dept: CARDIAC CATH/INVASIVE PROCEDURES | Age: 61
Discharge: HOME | End: 2021-10-15
Attending: INTERNAL MEDICINE | Admitting: INTERNAL MEDICINE
Payer: COMMERCIAL

## 2021-10-14 ENCOUNTER — APPOINTMENT (OUTPATIENT)
Dept: GENERAL RADIOLOGY | Age: 61
End: 2021-10-14
Attending: INTERNAL MEDICINE
Payer: COMMERCIAL

## 2021-10-14 ENCOUNTER — ANESTHESIA (OUTPATIENT)
Dept: CARDIAC CATH/INVASIVE PROCEDURES | Age: 61
End: 2021-10-14
Payer: COMMERCIAL

## 2021-10-14 VITALS
OXYGEN SATURATION: 99 % | TEMPERATURE: 98.1 F | SYSTOLIC BLOOD PRESSURE: 115 MMHG | DIASTOLIC BLOOD PRESSURE: 74 MMHG | RESPIRATION RATE: 10 BRPM

## 2021-10-14 PROBLEM — Z86.79 S/P ABLATION OF ATRIAL FIBRILLATION: Status: ACTIVE | Noted: 2021-10-14

## 2021-10-14 PROBLEM — Z98.890 S/P ABLATION OF ATRIAL FIBRILLATION: Status: ACTIVE | Noted: 2021-10-14

## 2021-10-14 LAB
ABO/RH: NORMAL
ACTIVATED CLOTTING TIME, LOW RANGE: 188 SEC
ACTIVATED CLOTTING TIME, LOW RANGE: 199 SEC
ACTIVATED CLOTTING TIME, LOW RANGE: 281 SEC
ACTIVATED CLOTTING TIME, LOW RANGE: 315 SEC
ACTIVATED CLOTTING TIME, LOW RANGE: 326 SEC
ACTIVATED CLOTTING TIME, LOW RANGE: 351 SEC
ACTIVATED CLOTTING TIME, LOW RANGE: 352 SEC
ACTIVATED CLOTTING TIME, LOW RANGE: 383 SEC
ANTIBODY SCREEN: NEGATIVE
BASE EXCESS MIXED: 1.2 (ref 0–1.2)
BASE EXCESS: ABNORMAL (ref 0–3.3)
CO2: 27 MMOL/L (ref 21–32)
EKG ATRIAL RATE: 60 BPM
EKG DIAGNOSIS: NORMAL
EKG P AXIS: 61 DEGREES
EKG P-R INTERVAL: 160 MS
EKG Q-T INTERVAL: 404 MS
EKG QRS DURATION: 100 MS
EKG QTC CALCULATION (BAZETT): 404 MS
EKG R AXIS: 57 DEGREES
EKG T AXIS: 52 DEGREES
EKG VENTRICULAR RATE: 60 BPM
GLUCOSE BLD-MCNC: 100 MG/DL (ref 70–99)
HCO3 ARTERIAL: 25.7 MMOL/L (ref 18–23)
HCT VFR BLD CALC: 44 % (ref 42–52)
HEMOGLOBIN: 14.9 GM/DL (ref 13.5–18)
O2 SATURATION: 100 % (ref 96–97)
PCO2 ARTERIAL: 39.4 MMHG (ref 32–45)
PH BLOOD: 7.42 (ref 7.34–7.45)
PO2 ARTERIAL: 540.2 MMHG (ref 75–100)
POC CALCIUM: 1.27 MMOL/L (ref 1.12–1.32)
POC CHLORIDE: 105 MMOL/L (ref 98–109)
POC CREATININE: 0.9 MG/DL (ref 0.9–1.3)
POTASSIUM SERPL-SCNC: 4.5 MMOL/L (ref 3.5–4.5)
SODIUM BLD-SCNC: 140 MMOL/L (ref 138–146)
SOURCE, BLOOD GAS: ABNORMAL

## 2021-10-14 PROCEDURE — 2580000003 HC RX 258

## 2021-10-14 PROCEDURE — 6360000002 HC RX W HCPCS: Performed by: NURSE ANESTHETIST, CERTIFIED REGISTERED

## 2021-10-14 PROCEDURE — 2580000003 HC RX 258: Performed by: NURSE ANESTHETIST, CERTIFIED REGISTERED

## 2021-10-14 PROCEDURE — 85347 COAGULATION TIME ACTIVATED: CPT

## 2021-10-14 PROCEDURE — 93325 DOPPLER ECHO COLOR FLOW MAPG: CPT | Performed by: INTERNAL MEDICINE

## 2021-10-14 PROCEDURE — 94010 BREATHING CAPACITY TEST: CPT

## 2021-10-14 PROCEDURE — 93656 COMPRE EP EVAL ABLTJ ATR FIB: CPT | Performed by: INTERNAL MEDICINE

## 2021-10-14 PROCEDURE — 7100000000 HC PACU RECOVERY - FIRST 15 MIN

## 2021-10-14 PROCEDURE — 93657 TX L/R ATRIAL FIB ADDL: CPT

## 2021-10-14 PROCEDURE — 93312 ECHO TRANSESOPHAGEAL: CPT

## 2021-10-14 PROCEDURE — 93623 PRGRMD STIMJ&PACG IV RX NFS: CPT | Performed by: INTERNAL MEDICINE

## 2021-10-14 PROCEDURE — 2709999900 HC NON-CHARGEABLE SUPPLY

## 2021-10-14 PROCEDURE — 3700000000 HC ANESTHESIA ATTENDED CARE

## 2021-10-14 PROCEDURE — 93005 ELECTROCARDIOGRAM TRACING: CPT | Performed by: INTERNAL MEDICINE

## 2021-10-14 PROCEDURE — C1732 CATH, EP, DIAG/ABL, 3D/VECT: HCPCS

## 2021-10-14 PROCEDURE — C1893 INTRO/SHEATH, FIXED,NON-PEEL: HCPCS

## 2021-10-14 PROCEDURE — 93662 INTRACARDIAC ECG (ICE): CPT

## 2021-10-14 PROCEDURE — 6370000000 HC RX 637 (ALT 250 FOR IP)

## 2021-10-14 PROCEDURE — C1759 CATH, INTRA ECHOCARDIOGRAPHY: HCPCS

## 2021-10-14 PROCEDURE — 93613 INTRACARDIAC EPHYS 3D MAPG: CPT | Performed by: INTERNAL MEDICINE

## 2021-10-14 PROCEDURE — 93613 INTRACARDIAC EPHYS 3D MAPG: CPT

## 2021-10-14 PROCEDURE — 94150 VITAL CAPACITY TEST: CPT

## 2021-10-14 PROCEDURE — 2500000003 HC RX 250 WO HCPCS

## 2021-10-14 PROCEDURE — 6370000000 HC RX 637 (ALT 250 FOR IP): Performed by: INTERNAL MEDICINE

## 2021-10-14 PROCEDURE — 3700000001 HC ADD 15 MINUTES (ANESTHESIA)

## 2021-10-14 PROCEDURE — 71045 X-RAY EXAM CHEST 1 VIEW: CPT

## 2021-10-14 PROCEDURE — 86850 RBC ANTIBODY SCREEN: CPT

## 2021-10-14 PROCEDURE — 93656 COMPRE EP EVAL ABLTJ ATR FIB: CPT

## 2021-10-14 PROCEDURE — 93308 TTE F-UP OR LMTD: CPT

## 2021-10-14 PROCEDURE — 93312 ECHO TRANSESOPHAGEAL: CPT | Performed by: INTERNAL MEDICINE

## 2021-10-14 PROCEDURE — 86901 BLOOD TYPING SEROLOGIC RH(D): CPT

## 2021-10-14 PROCEDURE — 93010 ELECTROCARDIOGRAM REPORT: CPT | Performed by: INTERNAL MEDICINE

## 2021-10-14 PROCEDURE — C1733 CATH, EP, OTHR THAN COOL-TIP: HCPCS

## 2021-10-14 PROCEDURE — 7100000001 HC PACU RECOVERY - ADDTL 15 MIN

## 2021-10-14 PROCEDURE — 6360000002 HC RX W HCPCS

## 2021-10-14 PROCEDURE — 86900 BLOOD TYPING SEROLOGIC ABO: CPT

## 2021-10-14 PROCEDURE — 2500000003 HC RX 250 WO HCPCS: Performed by: NURSE ANESTHETIST, CERTIFIED REGISTERED

## 2021-10-14 PROCEDURE — 93662 INTRACARDIAC ECG (ICE): CPT | Performed by: INTERNAL MEDICINE

## 2021-10-14 PROCEDURE — C1894 INTRO/SHEATH, NON-LASER: HCPCS

## 2021-10-14 PROCEDURE — C1730 CATH, EP, 19 OR FEW ELECT: HCPCS

## 2021-10-14 PROCEDURE — 2580000003 HC RX 258: Performed by: INTERNAL MEDICINE

## 2021-10-14 PROCEDURE — 2720000010 HC SURG SUPPLY STERILE

## 2021-10-14 RX ORDER — PROPRANOLOL HCL 60 MG
60 CAPSULE, EXTENDED RELEASE 24HR ORAL DAILY
Status: DISCONTINUED | OUTPATIENT
Start: 2021-10-15 | End: 2021-10-15 | Stop reason: HOSPADM

## 2021-10-14 RX ORDER — SODIUM CHLORIDE, SODIUM LACTATE, POTASSIUM CHLORIDE, CALCIUM CHLORIDE 600; 310; 30; 20 MG/100ML; MG/100ML; MG/100ML; MG/100ML
INJECTION, SOLUTION INTRAVENOUS CONTINUOUS PRN
Status: DISCONTINUED | OUTPATIENT
Start: 2021-10-14 | End: 2021-10-14 | Stop reason: SDUPTHER

## 2021-10-14 RX ORDER — FENTANYL CITRATE 50 UG/ML
INJECTION, SOLUTION INTRAMUSCULAR; INTRAVENOUS PRN
Status: DISCONTINUED | OUTPATIENT
Start: 2021-10-14 | End: 2021-10-14 | Stop reason: SDUPTHER

## 2021-10-14 RX ORDER — DIPHENHYDRAMINE HYDROCHLORIDE 50 MG/ML
12.5 INJECTION INTRAMUSCULAR; INTRAVENOUS
Status: DISCONTINUED | OUTPATIENT
Start: 2021-10-14 | End: 2021-10-14

## 2021-10-14 RX ORDER — PROPAFENONE HYDROCHLORIDE 225 MG/1
225 CAPSULE, EXTENDED RELEASE ORAL 2 TIMES DAILY
Status: DISCONTINUED | OUTPATIENT
Start: 2021-10-14 | End: 2021-10-15 | Stop reason: HOSPADM

## 2021-10-14 RX ORDER — ACETAMINOPHEN 325 MG/1
650 TABLET ORAL EVERY 6 HOURS PRN
Status: DISCONTINUED | OUTPATIENT
Start: 2021-10-14 | End: 2021-10-15 | Stop reason: HOSPADM

## 2021-10-14 RX ORDER — FUROSEMIDE 10 MG/ML
INJECTION INTRAMUSCULAR; INTRAVENOUS PRN
Status: DISCONTINUED | OUTPATIENT
Start: 2021-10-14 | End: 2021-10-14 | Stop reason: SDUPTHER

## 2021-10-14 RX ORDER — MAGNESIUM SULFATE IN WATER 40 MG/ML
2000 INJECTION, SOLUTION INTRAVENOUS PRN
Status: DISCONTINUED | OUTPATIENT
Start: 2021-10-14 | End: 2021-10-15 | Stop reason: HOSPADM

## 2021-10-14 RX ORDER — HYDROCODONE BITARTRATE AND ACETAMINOPHEN 5; 325 MG/1; MG/1
2 TABLET ORAL PRN
Status: DISCONTINUED | OUTPATIENT
Start: 2021-10-14 | End: 2021-10-14

## 2021-10-14 RX ORDER — ASPIRIN 81 MG/1
81 TABLET ORAL DAILY
Status: DISCONTINUED | OUTPATIENT
Start: 2021-10-15 | End: 2021-10-15 | Stop reason: HOSPADM

## 2021-10-14 RX ORDER — SODIUM CHLORIDE 9 MG/ML
INJECTION, SOLUTION INTRAVENOUS CONTINUOUS PRN
Status: DISCONTINUED | OUTPATIENT
Start: 2021-10-14 | End: 2021-10-14 | Stop reason: SDUPTHER

## 2021-10-14 RX ORDER — PANTOPRAZOLE SODIUM 40 MG/1
40 TABLET, DELAYED RELEASE ORAL
Status: DISCONTINUED | OUTPATIENT
Start: 2021-10-14 | End: 2021-10-15 | Stop reason: HOSPADM

## 2021-10-14 RX ORDER — ROCURONIUM BROMIDE 10 MG/ML
INJECTION, SOLUTION INTRAVENOUS PRN
Status: DISCONTINUED | OUTPATIENT
Start: 2021-10-14 | End: 2021-10-14 | Stop reason: SDUPTHER

## 2021-10-14 RX ORDER — HYDROCODONE BITARTRATE AND ACETAMINOPHEN 5; 325 MG/1; MG/1
1 TABLET ORAL PRN
Status: DISCONTINUED | OUTPATIENT
Start: 2021-10-14 | End: 2021-10-14

## 2021-10-14 RX ORDER — ONDANSETRON 2 MG/ML
INJECTION INTRAMUSCULAR; INTRAVENOUS PRN
Status: DISCONTINUED | OUTPATIENT
Start: 2021-10-14 | End: 2021-10-14 | Stop reason: SDUPTHER

## 2021-10-14 RX ORDER — SUCCINYLCHOLINE/SOD CL,ISO/PF 100 MG/5ML
SYRINGE (ML) INTRAVENOUS PRN
Status: DISCONTINUED | OUTPATIENT
Start: 2021-10-14 | End: 2021-10-14 | Stop reason: SDUPTHER

## 2021-10-14 RX ORDER — FENTANYL CITRATE 50 UG/ML
50 INJECTION, SOLUTION INTRAMUSCULAR; INTRAVENOUS EVERY 5 MIN PRN
Status: DISCONTINUED | OUTPATIENT
Start: 2021-10-14 | End: 2021-10-14

## 2021-10-14 RX ORDER — SODIUM CHLORIDE 9 MG/ML
25 INJECTION, SOLUTION INTRAVENOUS PRN
Status: DISCONTINUED | OUTPATIENT
Start: 2021-10-14 | End: 2021-10-15 | Stop reason: HOSPADM

## 2021-10-14 RX ORDER — DEXAMETHASONE SODIUM PHOSPHATE 4 MG/ML
INJECTION, SOLUTION INTRA-ARTICULAR; INTRALESIONAL; INTRAMUSCULAR; INTRAVENOUS; SOFT TISSUE PRN
Status: DISCONTINUED | OUTPATIENT
Start: 2021-10-14 | End: 2021-10-14 | Stop reason: SDUPTHER

## 2021-10-14 RX ORDER — LIDOCAINE HYDROCHLORIDE 20 MG/ML
INJECTION, SOLUTION EPIDURAL; INFILTRATION; INTRACAUDAL; PERINEURAL PRN
Status: DISCONTINUED | OUTPATIENT
Start: 2021-10-14 | End: 2021-10-14 | Stop reason: SDUPTHER

## 2021-10-14 RX ORDER — PROMETHAZINE HYDROCHLORIDE 25 MG/ML
6.25 INJECTION, SOLUTION INTRAMUSCULAR; INTRAVENOUS
Status: DISCONTINUED | OUTPATIENT
Start: 2021-10-14 | End: 2021-10-14

## 2021-10-14 RX ORDER — PROTAMINE SULFATE 10 MG/ML
INJECTION, SOLUTION INTRAVENOUS PRN
Status: DISCONTINUED | OUTPATIENT
Start: 2021-10-14 | End: 2021-10-14 | Stop reason: SDUPTHER

## 2021-10-14 RX ORDER — MEPERIDINE HYDROCHLORIDE 25 MG/ML
12.5 INJECTION INTRAMUSCULAR; INTRAVENOUS; SUBCUTANEOUS EVERY 5 MIN PRN
Status: DISCONTINUED | OUTPATIENT
Start: 2021-10-14 | End: 2021-10-14

## 2021-10-14 RX ORDER — PROPOFOL 10 MG/ML
INJECTION, EMULSION INTRAVENOUS PRN
Status: DISCONTINUED | OUTPATIENT
Start: 2021-10-14 | End: 2021-10-14 | Stop reason: SDUPTHER

## 2021-10-14 RX ORDER — CITALOPRAM 20 MG/1
20 TABLET ORAL DAILY
Status: DISCONTINUED | OUTPATIENT
Start: 2021-10-15 | End: 2021-10-15 | Stop reason: HOSPADM

## 2021-10-14 RX ORDER — AMIODARONE HYDROCHLORIDE 50 MG/ML
INJECTION, SOLUTION INTRAVENOUS PRN
Status: DISCONTINUED | OUTPATIENT
Start: 2021-10-14 | End: 2021-10-14 | Stop reason: SDUPTHER

## 2021-10-14 RX ORDER — HYDROMORPHONE HCL 110MG/55ML
0.5 PATIENT CONTROLLED ANALGESIA SYRINGE INTRAVENOUS EVERY 5 MIN PRN
Status: DISCONTINUED | OUTPATIENT
Start: 2021-10-14 | End: 2021-10-14

## 2021-10-14 RX ORDER — HYDRALAZINE HYDROCHLORIDE 20 MG/ML
5 INJECTION INTRAMUSCULAR; INTRAVENOUS EVERY 10 MIN PRN
Status: DISCONTINUED | OUTPATIENT
Start: 2021-10-14 | End: 2021-10-14

## 2021-10-14 RX ORDER — SODIUM CHLORIDE 0.9 % (FLUSH) 0.9 %
5-40 SYRINGE (ML) INJECTION EVERY 12 HOURS SCHEDULED
Status: DISCONTINUED | OUTPATIENT
Start: 2021-10-14 | End: 2021-10-15 | Stop reason: HOSPADM

## 2021-10-14 RX ORDER — SODIUM CHLORIDE 0.9 % (FLUSH) 0.9 %
5-40 SYRINGE (ML) INJECTION PRN
Status: DISCONTINUED | OUTPATIENT
Start: 2021-10-14 | End: 2021-10-15 | Stop reason: HOSPADM

## 2021-10-14 RX ORDER — LISINOPRIL 5 MG/1
5 TABLET ORAL DAILY
Status: DISCONTINUED | OUTPATIENT
Start: 2021-10-14 | End: 2021-10-15 | Stop reason: HOSPADM

## 2021-10-14 RX ORDER — METOCLOPRAMIDE HYDROCHLORIDE 5 MG/ML
10 INJECTION INTRAMUSCULAR; INTRAVENOUS
Status: DISCONTINUED | OUTPATIENT
Start: 2021-10-14 | End: 2021-10-14

## 2021-10-14 RX ORDER — LABETALOL HYDROCHLORIDE 5 MG/ML
5 INJECTION, SOLUTION INTRAVENOUS EVERY 10 MIN PRN
Status: DISCONTINUED | OUTPATIENT
Start: 2021-10-14 | End: 2021-10-14

## 2021-10-14 RX ADMIN — AMIODARONE HYDROCHLORIDE 150 MG: 50 INJECTION, SOLUTION INTRAVENOUS at 10:59

## 2021-10-14 RX ADMIN — APIXABAN 5 MG: 5 TABLET, FILM COATED ORAL at 18:40

## 2021-10-14 RX ADMIN — LIDOCAINE HYDROCHLORIDE 100 MG: 20 INJECTION, SOLUTION EPIDURAL; INFILTRATION; INTRACAUDAL; PERINEURAL at 07:53

## 2021-10-14 RX ADMIN — ROCURONIUM BROMIDE 40 MG: 10 INJECTION INTRAVENOUS at 08:12

## 2021-10-14 RX ADMIN — FUROSEMIDE 20 MG: 10 INJECTION, SOLUTION INTRAMUSCULAR; INTRAVENOUS at 11:15

## 2021-10-14 RX ADMIN — PROTAMINE SULFATE 30 MG: 10 INJECTION, SOLUTION INTRAVENOUS at 11:03

## 2021-10-14 RX ADMIN — ACETAMINOPHEN 650 MG: 325 TABLET ORAL at 22:22

## 2021-10-14 RX ADMIN — SODIUM CHLORIDE, PRESERVATIVE FREE 10 ML: 5 INJECTION INTRAVENOUS at 19:52

## 2021-10-14 RX ADMIN — ROCURONIUM BROMIDE 10 MG: 10 INJECTION INTRAVENOUS at 07:51

## 2021-10-14 RX ADMIN — ROCURONIUM BROMIDE 10 MG: 10 INJECTION INTRAVENOUS at 10:28

## 2021-10-14 RX ADMIN — DEXAMETHASONE SODIUM PHOSPHATE 8 MG: 4 INJECTION, SOLUTION INTRAMUSCULAR; INTRAVENOUS at 08:12

## 2021-10-14 RX ADMIN — ROCURONIUM BROMIDE 10 MG: 10 INJECTION INTRAVENOUS at 09:51

## 2021-10-14 RX ADMIN — PANTOPRAZOLE SODIUM 40 MG: 40 TABLET, DELAYED RELEASE ORAL at 18:40

## 2021-10-14 RX ADMIN — SUGAMMADEX 210 MG: 100 INJECTION, SOLUTION INTRAVENOUS at 11:13

## 2021-10-14 RX ADMIN — SODIUM CHLORIDE, POTASSIUM CHLORIDE, SODIUM LACTATE AND CALCIUM CHLORIDE: 600; 310; 30; 20 INJECTION, SOLUTION INTRAVENOUS at 07:39

## 2021-10-14 RX ADMIN — PROPOFOL 200 MG: 10 INJECTION, EMULSION INTRAVENOUS at 07:54

## 2021-10-14 RX ADMIN — PHENYLEPHRINE HYDROCHLORIDE 100 MCG/MIN: 10 INJECTION INTRAVENOUS at 08:00

## 2021-10-14 RX ADMIN — ROCURONIUM BROMIDE 5 MG: 10 INJECTION INTRAVENOUS at 10:45

## 2021-10-14 RX ADMIN — FENTANYL CITRATE 100 MCG: 50 INJECTION, SOLUTION INTRAMUSCULAR; INTRAVENOUS at 07:49

## 2021-10-14 RX ADMIN — Medication 100 MG: at 07:55

## 2021-10-14 RX ADMIN — SODIUM CHLORIDE: 900 INJECTION INTRAVENOUS at 08:00

## 2021-10-14 RX ADMIN — ONDANSETRON 4 MG: 2 INJECTION INTRAMUSCULAR; INTRAVENOUS at 11:04

## 2021-10-14 RX ADMIN — ONDANSETRON 4 MG: 2 INJECTION INTRAMUSCULAR; INTRAVENOUS at 08:12

## 2021-10-14 RX ADMIN — ACETAMINOPHEN 650 MG: 325 TABLET ORAL at 15:33

## 2021-10-14 RX ADMIN — ROCURONIUM BROMIDE 10 MG: 10 INJECTION INTRAVENOUS at 09:21

## 2021-10-14 ASSESSMENT — PULMONARY FUNCTION TESTS
PIF_VALUE: 19
PIF_VALUE: 11
PIF_VALUE: 19
PIF_VALUE: 18
PIF_VALUE: 19
PIF_VALUE: 2
PIF_VALUE: 18
PIF_VALUE: 20
PIF_VALUE: 19
PIF_VALUE: 19
PIF_VALUE: 0
PIF_VALUE: 19
PIF_VALUE: 19
PIF_VALUE: 22
PIF_VALUE: 18
PIF_VALUE: 19
PIF_VALUE: 18
PIF_VALUE: 19
PIF_VALUE: 18
PIF_VALUE: 19
PIF_VALUE: 18
PIF_VALUE: 18
PIF_VALUE: 15
PIF_VALUE: 4
PIF_VALUE: 19
PIF_VALUE: 18
PIF_VALUE: 19
PIF_VALUE: 19
PIF_VALUE: 1
PIF_VALUE: 18
PIF_VALUE: 19
PIF_VALUE: 19
PIF_VALUE: 17
PIF_VALUE: 15
PIF_VALUE: 18
PIF_VALUE: 1
PIF_VALUE: 18
PIF_VALUE: 17
PIF_VALUE: 19
PIF_VALUE: 19
PIF_VALUE: 18
PIF_VALUE: 19
PIF_VALUE: 22
PIF_VALUE: 0
PIF_VALUE: 0
PIF_VALUE: 21
PIF_VALUE: 19
PIF_VALUE: 19
PIF_VALUE: 1
PIF_VALUE: 19
PIF_VALUE: 18
PIF_VALUE: 3
PIF_VALUE: 0
PIF_VALUE: 17
PIF_VALUE: 19
PIF_VALUE: 19
PIF_VALUE: 22
PIF_VALUE: 18
PIF_VALUE: 19
PIF_VALUE: 18
PIF_VALUE: 15
PIF_VALUE: 19
PIF_VALUE: 14
PIF_VALUE: 0
PIF_VALUE: 18
PIF_VALUE: 21
PIF_VALUE: 18
PIF_VALUE: 19
PIF_VALUE: 9
PIF_VALUE: 18
PIF_VALUE: 19
PIF_VALUE: 20
PIF_VALUE: 22
PIF_VALUE: 19
PIF_VALUE: 18
PIF_VALUE: 18
PIF_VALUE: 19
PIF_VALUE: 18
PIF_VALUE: 18
PIF_VALUE: 19
PIF_VALUE: 18
PIF_VALUE: 19
PIF_VALUE: 19
PIF_VALUE: 0
PIF_VALUE: 18
PIF_VALUE: 18
PIF_VALUE: 21
PIF_VALUE: 19
PIF_VALUE: 19
PIF_VALUE: 21
PIF_VALUE: 19
PIF_VALUE: 20
PIF_VALUE: 18
PIF_VALUE: 19
PIF_VALUE: 18
PIF_VALUE: 19
PIF_VALUE: 20
PIF_VALUE: 18
PIF_VALUE: 1
PIF_VALUE: 6
PIF_VALUE: 19
PIF_VALUE: 18
PIF_VALUE: 17
PIF_VALUE: 1
PIF_VALUE: 14
PIF_VALUE: 18
PIF_VALUE: 19
PIF_VALUE: 18
PIF_VALUE: 7
PIF_VALUE: 19
PIF_VALUE: 18
PIF_VALUE: 2
PIF_VALUE: 19
PIF_VALUE: 19
PIF_VALUE: 0
PIF_VALUE: 19
PIF_VALUE: 19
PIF_VALUE: 18
PIF_VALUE: 19
PIF_VALUE: 22
PIF_VALUE: 18
PIF_VALUE: 19
PIF_VALUE: 14
PIF_VALUE: 19
PIF_VALUE: 1
PIF_VALUE: 0
PIF_VALUE: 19
PIF_VALUE: 18
PIF_VALUE: 0
PIF_VALUE: 15
PIF_VALUE: 18
PIF_VALUE: 0
PIF_VALUE: 0
PIF_VALUE: 14
PIF_VALUE: 19
PIF_VALUE: 18
PIF_VALUE: 19
PIF_VALUE: 21
PIF_VALUE: 19
PIF_VALUE: 19
PIF_VALUE: 22
PIF_VALUE: 18
PIF_VALUE: 5
PIF_VALUE: 6
PIF_VALUE: 19
PIF_VALUE: 1
PIF_VALUE: 19
PIF_VALUE: 15
PIF_VALUE: 19
PIF_VALUE: 0
PIF_VALUE: 19
PIF_VALUE: 1
PIF_VALUE: 19
PIF_VALUE: 5
PIF_VALUE: 19
PIF_VALUE: 20
PIF_VALUE: 19
PIF_VALUE: 0
PIF_VALUE: 19
PIF_VALUE: 18
PIF_VALUE: 18
PIF_VALUE: 20
PIF_VALUE: 20
PIF_VALUE: 5
PIF_VALUE: 19
PIF_VALUE: 19

## 2021-10-14 ASSESSMENT — ENCOUNTER SYMPTOMS
SHORTNESS OF BREATH: 0
COUGH: 0
NAUSEA: 0
PHOTOPHOBIA: 0
COLOR CHANGE: 0
WHEEZING: 0
DIARRHEA: 0
BACK PAIN: 0
EYE PAIN: 0
CHEST TIGHTNESS: 0
VOMITING: 0
BLOOD IN STOOL: 0
CONSTIPATION: 0
ABDOMINAL PAIN: 0

## 2021-10-14 ASSESSMENT — PAIN SCALES - GENERAL
PAINLEVEL_OUTOF10: 3
PAINLEVEL_OUTOF10: 6
PAINLEVEL_OUTOF10: 5
PAINLEVEL_OUTOF10: 3
PAINLEVEL_OUTOF10: 3
PAINLEVEL_OUTOF10: 0
PAINLEVEL_OUTOF10: 0
PAINLEVEL_OUTOF10: 6

## 2021-10-14 ASSESSMENT — PAIN DESCRIPTION - FREQUENCY: FREQUENCY: CONTINUOUS

## 2021-10-14 ASSESSMENT — PAIN DESCRIPTION - LOCATION: LOCATION: HEAD

## 2021-10-14 ASSESSMENT — PAIN DESCRIPTION - ORIENTATION: ORIENTATION: MID

## 2021-10-14 ASSESSMENT — PAIN - FUNCTIONAL ASSESSMENT: PAIN_FUNCTIONAL_ASSESSMENT: ACTIVITIES ARE NOT PREVENTED

## 2021-10-14 ASSESSMENT — PAIN DESCRIPTION - DESCRIPTORS: DESCRIPTORS: ACHING

## 2021-10-14 ASSESSMENT — PAIN DESCRIPTION - ONSET: ONSET: GRADUAL

## 2021-10-14 ASSESSMENT — PAIN DESCRIPTION - PAIN TYPE: TYPE: ACUTE PAIN

## 2021-10-14 NOTE — ANESTHESIA POSTPROCEDURE EVALUATION
Department of Anesthesiology  Postprocedure Note    Patient: Isabella Robles  MRN: [id number]  YOB: 1960  Date of evaluation: 10/14/2021  Time:  11:26 AM     Procedure Summary     Date: 10/14/21 Room / Location: [address] [place] [address] Cath Lab    Anesthesia Start: 0953 Anesthesia Stop: 5679    Procedure: 1200 [facility] EP AFIB ABLATION W ANESTH Diagnosis:       Unspecified atrial fibrillation      S/P ablation of atrial fibrillation    Scheduled Providers:  Responsible Provider: Raheem Altamirano DO    Anesthesia Type: general ASA Status: 3          Anesthesia Type: general    Melvin Phase I: Melvin Score: 10    Melvin Phase II:      Last vitals: Reviewed and per EMR flowsheets.        Anesthesia Post Evaluation    Patient location during evaluation: PACU  Patient participation: complete - patient participated  Level of consciousness: awake and alert  Pain score: 0  Airway patency: patent  Nausea & Vomiting: no nausea and no vomiting  Complications: no  Cardiovascular status: blood pressure returned to baseline and hemodynamically stable  Respiratory status: acceptable, spontaneous ventilation, nonlabored ventilation and face mask  Hydration status: stable

## 2021-10-14 NOTE — PROGRESS NOTES
1130: Arrived to PACU from EP lab. Monitors applied, alarms on. Report obtained from [facility]. 1140: Sheaths pulled and pressure held left groin per [facility]  0493: EKG done. 1215: Dressing applied left groin  1221: Sheaths pulled and pressure held right groin per [facility]  7275: Echo being done. 1240: Dressing applied right groin. 1300: Wife updated and aware we are waiting for a room assignment. 1330: 0220 Pedro Luis Jalloh: Transported to room 3125. Wife at bedside.

## 2021-10-14 NOTE — OP NOTE
PROCEDURES PERFORMED:    1. Comprehensive electrophysiologic evaluation including transseptal catheterization, insertion and repositioning of multiple electrode catheters with induction or attempted induction of an arrhythmia including left or right atrial pacing/recording when necessary, right ventricular pacing/recording when necessary, and His bundle recording when necessary with intracardiac catheter ablation of atrial fibrillation by pulmonary vein isolation  2. Intracardiac electrophysiologic three-dimensional mapping  3. Transseptal puncture through an intact septum with measurement of RA and LA pressures. 4. Intracardiac echocardiography. 5. Drug testing        ATTENDING: Luisa Marroquin MD.    COMPLICATIONS: None. ESTIMATED BLOOD LOSS: 30cc    ANESTHESIA: General with intubation    MEDICATIONS USED FOR INDUCTION/TESTING: Adenosine    PREOPERATIVE DIAGNOSIS: Paroxysmal Atrial fibrillation     POSTOPERATIVE DIAGNOSIS: Paroxysmal Atrial fibrillation sp Pulmonary vein isolation     INDICATIONS FOR PROCEDURE: Patient with hx of symptomatic Paroxysmal Atrial fibrillation despite medical therapy here for Ablation       DETAILS OF PROCEDURE:     The patient was brought to the electrophysiology laboratory in stable condition. The patient was in a fasting, nonsedated state. The risks, benefits and alternatives of the procedure were discussed with the patient and his family. The risks including, but not limited to, the risks of vascular injury, bleeding, infection, radiation exposure, injury to cardiac and surrounding structures (including esophageal and phrenic nerve injury in addition to pulmonary vein stenosis), injury to the normal conduction system of the heart, stroke, myocardial infarction and death were discussed. Written informed consent was obtained and placed on the chart. A timeout protocol was completed to identify the patient and the procedure being performed.     ERIC was performed prior to the ablation procedure to assure the absence of any intracardiac thrombi or other contraindications to proceeding with ablation. The full ERIC report is dictated separately. The patient was prepped and draped in a sterile fashion. Lidocaine was administered to the right and left groin. Using a modified Seldinger technique, venous access was obtained and sheaths were placed as detailed below. Catheters were then placed under fluoroscopic guidance as detailed below. SHEATH AND CATHETER PLACEMENT:  Location  Sheath  Catheter  site    Left femoral vein  7F  Biosense Little 6F Decapolar catheter  Coronary sinus    Left femoral vein  11F  Quadripolar catheter      Intracardiac Echo with sound technology  Right atrium    Right femoral vein  8F Short and SRO sheath D-F curve Smart touch irrigated [employer] Ablation catheter      Pentaray catheter  Pulmonay veins, Left atrium, RA, RV      LA mapping    Right femoral vein  8F Short  Quadripolar catheter  RV      A small caliber arterial line in the radial artery was obtained by the anesthesia service. Comprehensive EP study    Patient presented in sinus rhythm so we performed Comprehensive EP study    CL : sinus bradycardia 1132ms  NC: 196 ms  QRS: 104 ms  QT: 450 ms    AH : 82 ms  HV : 43 ms    Sinus node function is mildly abnormal but could be from sedation     AVWCL : 480 ms  VAWCL: no VA at 600 ms    AVNERP : 600 / 480 ms  AERP: 600 / 280 ms    Arrhythmia induction    Programmed stimulation was performed. Patient was easily induced into atrial fibrillation so we decided to proceed with PV isolation first.        Intracardiac echocardiography:     A baseline intracardiac echocardiography study was performed. During the procedure, intracardiac echocardiography was used for transseptal puncture, monitoring of catheter placement during radiofrequency lesions, and monitoring for complications.     Three-dimensional electroanatomical mapping:    A three-dimensional electroanatomical mapping: Using the [employer] CARTO 3 three-dimensional electroanatomical map, a shell map of the left atrium and the pulmonary veins was created by dragging the ablation catheter and the pentaray catheter in different areas of the left atrium and in the pulmonary veins. The map was used for aiding the navigation process and to reduce fluoroscopy use. It was also used to guide ablation lesions and to sharon those lesions. Trans-septal puncture:     During transseptal access, he was in atrial fib ,  an 0.032 inch J-tipped guidewire was advanced through the 8-Barbadian sheath in the right femoral vein under fluoroscopic guidance. An SRO sheath and dilator were advanced over the guidewire into the superior vena cava under fluoroscopic guidance. The guidewire was removed. A [place] 1 needle was advanced through the dilator until the tip was slightly behind the tip of the dilator. This system was withdrawn until the apparatus was in contact with the foramen ovale. Transseptal access was obtained. Under fluoroscopic, hemodynamic ((Mean RA pressure 7 cm and mean LA pressure on transseptal was 12 mm) and ultrasound guidance, the left atrium was cannulated and sheath advanced. The dilator and needle were removed. Intracardiac echocardiography demonstrated no acute complications. Heparin was given to the patient to keep ACT at around 350sec through out procedure with ACT checks every 15 minutes     The pentaray catheter was sequentially positioned in the ostium of each of the pulmonary veins under fluoroscopic, electroanatomic, electrophysiologic and ultrasound guidance. Examination of the Pentaray catheter signals demonstrated conduction of electrical activity in all the pulmonary veins (two left pulmonary veins and two right pulmonary veins). Ablation:     The Smart touch ablation catheter was advanced into position near the right-sided pulmonary veins.  A wide circumferential ablation line was performed to encompass the ostia of both the right superior and right inferior pulmonary veins. The settings for ablation were 35 zamora with a contact force ranging between  3 gm/sec to 40 gm/ sec was given per lesion with assessment of signal reduction at the site of ablation, impedance drop, Visitag ablation mapping and also FTI upto 450 and Ablation index of upto 450 on posterior wall and upto 550 on anterior wall    Esophageal temperature was monitored through out the procedure with movement of the esophageal temperature probe. Max temp reached during whole procedure on esophageal temp probe was 37.3 C    Ablation lesions were continued until a large circumference ablation line was created, both anterior and posterior to the right-sided pulmonary veins. After ablation was completed, entrance block from each of the veins was checked. A wide circumference ablation line was similarly done around the left-sided superior and inferior pulmonary veins - including ridge and evelyne area (needed to be ablated to get complete isolation) was ablated. Entrance block from the left-sided pulmonary veins was then checked and confirmed. Patient still remained in atrial fibrillation so we cardioverted the patient at 200 J into sinus bradycardia. Following ablation, we confirmed entrance block at each vessel again. Pacing was also performed from each pole of the pentaray catheter at 10 milliamps, 2 milliseconds to assess for exit block. Both entrance and exit block were confirmed. Veins with insufficient block were revisited and complete entrance and exit block was confirmed in all pulmonary veins    Drug Infusion :     Adenosine was given to assess for spontaneous activation of PVP and also to assess dormant conduction from the PV. Adenosine was given at each vein and exit block from Pulmonary vein were confirmed and entrance and exit block confirmed.     Post adenosine pacing was performed and  No sustained arrhythmia was induced    Patient though had PACs frequented from left side so we try to map and the PACU earliest signal was coming from the left atrial appendage which was not ablated. There was fractionated signals in the appendage but it was high risk to ablate within the appendage so we did not ablate at this time and if there is any recurrence in future then we can reconsider isolating the appendage but at this time we did not perform that ablation for a PAC. Intracardiac echocardiography was used to document the absence of any significant effusion or any other complication. Catheters were removed under fluoroscopic guidance. The patient was extubated and transferred to recovery. Sheaths were pulled and manual compression performed to achieve hemostasis in recovery  . There was no bleeding noted from any of the access sites. SUMMARY:    Successful isolation of the pulmonary veins for ablation of paroxysmal atrial fibrillation      RECOMMENDATIONS:  1. Admit to the floor  2. Call Dara Lopez if the patient becomes hypotensive, febrile, unstable or if there is a change in mental status. 3.  Resume anticoagulation as recommended. 4. Bed rest x6 hours with legs straight after sheaths are removed. 5. Continue current medications. 6. Follow up in the electrophysiology clinic in three weeks.

## 2021-10-14 NOTE — PROCEDURES
Harman Estrada   57 y.o., male  1960      10/14/2021    Attending: Catarino Brannon MD    Sedation : Anesthesia                        Indication:       Pre-atrial fibrillation ablation                                                                                                                                                   After obtaining the informed cosent. Pt brought to EP lab. Sedation per anesthesia . After proper sedation ERIC performed. US Doppler was used to assess abnormalities where appropriate. Post procedure pt is stable.       Findings:  LV=  Appears normal LVEF  LA=  normal  ERIN= NO CLOTS  RV= normal  RA=  normal  IAS= Normal  Bubble study=not donefor PFO or ASD  AV= TRICUSPID, no significant regurgitation or stenosis  MV= mild regurgitation, no stenosis  TV=mild regurgitation  PV= no significant regurgitation,   Aorta=no aneursyms or significant plaque noted  PUL KANDI FLOW=mild systolic blunting noted    Impression:  No ERIN clot    Plan:  Proceed with ablation

## 2021-10-14 NOTE — PROGRESS NOTES
Castro cath removed for post procedure process and per pt request. No issues noted.  Will monitor for pt to void post cath removal.

## 2021-10-14 NOTE — H&P
Electrophysiology h&P Note      Reason for consultation:  Atrial fibrillation    Chief complaint : Atrial fibrillation    Referring physician:       Primary care physician: Ivett Muñoz      History of Present Illness: Today visit (10/14/21)    Patient here for atrial fibrillation ablation. No change in H&P noted from previous clinic visit. Previous visit:   Patient is a 39-year-old male with a history of hypertension, paroxysmal atrial fibrillation was referred to the office by Dr. Klarissa Ott for atrial fibrillation management. Patient reports he has had on and off A. fib for quite some time and had been cardioverted several times up to 5-6 times and he has been on Rythmol and he had breakthrough episodes. When he is in atrial fibrillation patient does not feel good and he has tiredness and weakness and short of breath and he is not able to do much work. When he is not in A. fib he feels good. Patient denies any chest pain, palpitations, shortness of breath, edema or dizziness. Patient drinks decaf coffee. Patient denies alcohol        Pastmedical history:   Past Medical History:   Diagnosis Date    Atrial fibrillation [facility] 2006 7/23/12 Successful direct current cardioversion    Encounter for cardioversion procedure 07/06/2021    sucessful    Familial tremor     Family history of coronary artery disease     H/O 24 hour EKG monitoring 04/06/2006    The 48 holter monitor reveals the patient in SR with rare ventricular ectopic beat and rare supraventricular ectopic beat.  H/O cardiovascular stress test 06/20/2012    Probably normal perfusion Lexiscan Cardiolite study except for diaphragmatic artifact. Low-normal LV function, EF 50%    H/O echocardiogram 06/20/2012    Mild left atrial enlargement and right ventricular enlargement. Left ventricular hypertrophy with global hypokinesis and moderately reduced LV function.   EF 35-40%  Mild pulmomary HTN. Mild TR    H/O transesophageal echocardiography ([facility]) for monitoring 06/20/2012    Other than mild MR and TR, no other significant abnormalities seen. There is no intracavitary mass or thrombus, there is no atrial septal defect or patent foramen ovale    History of Doppler echocardiogram 10/05/2017    Normal left ventricle structure and function. Ejection fraction is low normal, visually estimated at 50-55%. No significant valvular disease noted. No evidence of pericardial effusion. Unremarkable echo.  History of exercise stress test 10/05/2017    treadmill    History of transesophageal echocardiography ([facility]) 07/06/2021    No thrombus noted in the left atrial appendage. Negative bubble study. No PFO or ASD noted.  HTN (hypertension)     Pre-op evaluation     Ulcerative colitis ([facility] [address]        Surgical history :   Past Surgical History:   Procedure Laterality Date    COLECTOMY      PROCTECTOMY         Family history:   Family History   Problem Relation Age of Onset    Heart Disease Mother     High Blood Pressure Mother     Heart Disease Father     Birth Defects Maternal Grandmother        Social history :  reports that he quit smoking about 20 years ago. He has never used smokeless tobacco. He reports current alcohol use. He reports that he does not use drugs. No Known Allergies    No current facility-administered medications on file prior to encounter. Current Outpatient Medications on File Prior to Encounter   Medication Sig Dispense Refill    aspirin EC 81 MG EC tablet Take 1 tablet by mouth daily 90 tablet 1    propranolol (INDERAL LA) 60 MG extended release capsule Take 1 capsule by mouth daily 90 capsule 3    propafenone (RYTHMOL SR) 225 MG extended release capsule Take 1 capsule by mouth 2 times daily 180 capsule 1    lisinopril (PRINIVIL;ZESTRIL) 5 MG tablet Take 1 tablet by mouth daily. 30 tablet 6    citalopram (CELEXA) 20 MG tablet Take 20 mg by mouth daily.         Multiple Vitamin (MULTIVITAMIN PO) Take 1 tablet by mouth daily. Review of Systems:   Review of Systems   Constitutional: Positive for fatigue. Negative for activity change, chills and fever. HENT: Negative for congestion, ear pain and tinnitus. Eyes: Negative for photophobia, pain and visual disturbance. Respiratory: Negative for cough, chest tightness, shortness of breath and wheezing. Cardiovascular: Negative for chest pain, palpitations and leg swelling. Gastrointestinal: Negative for abdominal pain, blood in stool, constipation, diarrhea, nausea and vomiting. Endocrine: Negative for cold intolerance and heat intolerance. Genitourinary: Negative for dysuria, flank pain and hematuria. Musculoskeletal: Positive for arthralgias. Negative for back pain, myalgias and neck stiffness. Skin: Negative for color change and rash. Allergic/Immunologic: Negative for food allergies. Neurological: Negative for dizziness, light-headedness, numbness and headaches. Hematological: Does not bruise/bleed easily. Psychiatric/Behavioral: Negative for agitation, behavioral problems and confusion. Examination:      Vitals:    10/14/21 0653   BP: 123/78   Pulse: 56   Resp: 14   Temp: 97.2 °F (36.2 °C)   TempSrc: Temporal   SpO2: 98%   Weight: 230 lb (104.3 kg)   Height: 6' 3\" (1.905 m)        Body mass index is 28.75 kg/m². Physical Exam  Constitutional:       Appearance: Normal appearance. He is not ill-appearing. HENT:      Head: Normocephalic and atraumatic. Mouth/Throat:      Mouth: Mucous membranes are moist.   Eyes:      Conjunctiva/sclera: Conjunctivae normal.   Cardiovascular:      Rate and Rhythm: Normal rate and regular rhythm. Heart sounds: No murmur heard. Pulmonary:      Effort: Pulmonary effort is normal.      Breath sounds: No rales. Abdominal:      General: Abdomen is flat. Palpations: Abdomen is soft.    Musculoskeletal:         General: No tenderness. Normal range of motion. Cervical back: Normal range of motion. Right lower leg: No edema. Left lower leg: No edema. Skin:     General: Skin is warm and dry. Neurological:      General: No focal deficit present. Mental Status: He is alert and oriented to person, place, and time. CBC:   Lab Results   Component Value Date    WBC 6.9 10/11/2021    HGB 14.3 10/11/2021    HCT 43.4 10/11/2021     10/11/2021     Lipids:  Lab Results   Component Value Date    CHOL 176 09/14/2011    TRIG 55 09/14/2011    HDL 56 (L) 09/14/2011    LDLDIRECT 122 (H) 09/14/2011     PT/INR:   Lab Results   Component Value Date    INR 0.95 10/11/2021        BMP:    Lab Results   Component Value Date     10/11/2021    K 4.1 10/11/2021     10/11/2021    CO2 27 10/11/2021    BUN 14 10/11/2021     CMP:   Lab Results   Component Value Date    AST 32 07/22/2012    PROT 7.1 07/22/2012    BILITOT 0.8 07/22/2012    ALKPHOS 61 07/22/2012     TSH:  No results found for: TSH    EKGINTERPRETATION - EKG Interpretation:  Sinus bradycardia      IMPRESSION / RECOMMENDATIONS:     Atrial fibrillation paroxysmal  Hypertension on lisinopril  History of ulcerative colitis  Family history of coronary artery disease  Essential tremors on propafenone      Patient with recurrent PAF episodes. Patient had multiple cardioversions 5-6 times - he reports. Patient has been on Rythmol and still has breakthrough episodes. Patient reports that he feels down and unable to do much when he is in atrial fibrillation and feels tired and weak at that time but when he is not in A. fib is fine. Patient is not on any anticoagulation as his chads vas score is only 1 and is only on aspirin currently.   Discussed with the patient about pathophysiology of atrial fibrillation in detail and discussed with the patient about A. fib ablation    The risks including, but not limited to, vascular injury, bleeding, infection, radiation exposure, injury to cardiac and surrounding structures (including esophageal and pulmonary vein injury), injury to the normal conduction system of the heart (possibly requiring a pacemaker), stroke, myocardial infarction and death were all discussed. The patient considered the risks, benefits and alternatives; decided to proceed with the procedure. Discussed with the patient quit alcohol and caffeine. Thanks again for allowing me to participate in care of this patient. Please call me if you have any questions. With best regards. Yanci Aragon MD, 10/14/2021 7:49 AM     Please note this report has been partially produced using speech recognition software and may contain errors related to that system including errors in grammar, punctuation, and spelling, as well as words and phrases that may be inappropriate. If there are any questions or concerns please feel free to contact the dictating provider for clarification.

## 2021-10-14 NOTE — PROGRESS NOTES
Patient instructed and educated on [employer]. Patient able to do 2500 ml. Vital capacity. Patient's goal is 3300ml.  Electronically signed by Gene El RCP on 10/14/2021 at 4:34 PM

## 2021-10-15 VITALS
OXYGEN SATURATION: 95 % | TEMPERATURE: 97.9 F | BODY MASS INDEX: 27.96 KG/M2 | HEIGHT: 75 IN | SYSTOLIC BLOOD PRESSURE: 114 MMHG | RESPIRATION RATE: 17 BRPM | WEIGHT: 224.87 LBS | DIASTOLIC BLOOD PRESSURE: 68 MMHG | HEART RATE: 64 BPM

## 2021-10-15 LAB
ALBUMIN SERPL-MCNC: 3.9 GM/DL (ref 3.4–5)
ANION GAP SERPL CALCULATED.3IONS-SCNC: 10 MMOL/L (ref 4–16)
BUN BLDV-MCNC: 8 MG/DL (ref 6–23)
CALCIUM SERPL-MCNC: 8.7 MG/DL (ref 8.3–10.6)
CHLORIDE BLD-SCNC: 103 MMOL/L (ref 99–110)
CO2: 25 MMOL/L (ref 21–32)
CREAT SERPL-MCNC: 0.8 MG/DL (ref 0.9–1.3)
GFR AFRICAN AMERICAN: >60 ML/MIN/1.73M2
GFR NON-AFRICAN AMERICAN: >60 ML/MIN/1.73M2
GLUCOSE BLD-MCNC: 96 MG/DL (ref 70–99)
HCT VFR BLD CALC: 41.3 % (ref 42–52)
HEMOGLOBIN: 13.3 GM/DL (ref 13.5–18)
MAGNESIUM: 2 MG/DL (ref 1.8–2.4)
MCH RBC QN AUTO: 30.9 PG (ref 27–31)
MCHC RBC AUTO-ENTMCNC: 32.2 % (ref 32–36)
MCV RBC AUTO: 95.8 FL (ref 78–100)
PDW BLD-RTO: 12.5 % (ref 11.7–14.9)
PHOSPHORUS: 2.7 MG/DL (ref 2.5–4.9)
PLATELET # BLD: 183 K/CU MM (ref 140–440)
PMV BLD AUTO: 10.5 FL (ref 7.5–11.1)
POTASSIUM SERPL-SCNC: 3.7 MMOL/L (ref 3.5–5.1)
RBC # BLD: 4.31 M/CU MM (ref 4.6–6.2)
SODIUM BLD-SCNC: 138 MMOL/L (ref 135–145)
WBC # BLD: 11.9 K/CU MM (ref 4–10.5)

## 2021-10-15 PROCEDURE — 85027 COMPLETE CBC AUTOMATED: CPT

## 2021-10-15 PROCEDURE — 80069 RENAL FUNCTION PANEL: CPT

## 2021-10-15 PROCEDURE — 6370000000 HC RX 637 (ALT 250 FOR IP): Performed by: INTERNAL MEDICINE

## 2021-10-15 PROCEDURE — 2580000003 HC RX 258: Performed by: INTERNAL MEDICINE

## 2021-10-15 PROCEDURE — 83735 ASSAY OF MAGNESIUM: CPT

## 2021-10-15 PROCEDURE — 36415 COLL VENOUS BLD VENIPUNCTURE: CPT

## 2021-10-15 PROCEDURE — 99217 PR OBSERVATION CARE DISCHARGE MANAGEMENT: CPT | Performed by: NURSE PRACTITIONER

## 2021-10-15 PROCEDURE — 84100 ASSAY OF PHOSPHORUS: CPT

## 2021-10-15 RX ORDER — PANTOPRAZOLE SODIUM 40 MG/1
40 TABLET, DELAYED RELEASE ORAL DAILY
Qty: 30 TABLET | Refills: 0 | Status: SHIPPED | OUTPATIENT
Start: 2021-10-15 | End: 2022-04-14

## 2021-10-15 RX ADMIN — PROPAFENONE HYDROCHLORIDE 225 MG: 225 CAPSULE, EXTENDED RELEASE ORAL at 09:37

## 2021-10-15 RX ADMIN — PROPRANOLOL HYDROCHLORIDE 60 MG: 60 CAPSULE, EXTENDED RELEASE ORAL at 09:37

## 2021-10-15 RX ADMIN — CITALOPRAM HYDROBROMIDE 20 MG: 20 TABLET ORAL at 09:37

## 2021-10-15 RX ADMIN — APIXABAN 5 MG: 5 TABLET, FILM COATED ORAL at 09:38

## 2021-10-15 RX ADMIN — ASPIRIN 81 MG: 81 TABLET, COATED ORAL at 09:37

## 2021-10-15 RX ADMIN — SODIUM CHLORIDE, PRESERVATIVE FREE 10 ML: 5 INJECTION INTRAVENOUS at 09:38

## 2021-10-15 RX ADMIN — LISINOPRIL 5 MG: 5 TABLET ORAL at 09:37

## 2021-10-15 RX ADMIN — ACETAMINOPHEN 650 MG: 325 TABLET ORAL at 05:56

## 2021-10-15 ASSESSMENT — PAIN SCALES - GENERAL
PAINLEVEL_OUTOF10: 0
PAINLEVEL_OUTOF10: 3
PAINLEVEL_OUTOF10: 0
PAINLEVEL_OUTOF10: 8

## 2021-10-15 NOTE — PLAN OF CARE
Problem: Pain:  Goal: Pain level will decrease  Description: Pain level will decrease  10/14/2021 2246 by Sid Barton RN  Outcome: Ongoing  10/14/2021 1605 by Jeannine Britt RN  Outcome: Ongoing  Goal: Control of acute pain  Description: Control of acute pain  10/14/2021 2246 by Sid Barton RN  Outcome: Ongoing  10/14/2021 1605 by Jeannine Britt RN  Outcome: Ongoing  Goal: Control of chronic pain  Description: Control of chronic pain  10/14/2021 2246 by Sid Barton RN  Outcome: Ongoing  10/14/2021 1605 by Jeannine Britt RN  Outcome: Ongoing  Goal: Patient's pain/discomfort is manageable  Description: Patient's pain/discomfort is manageable  10/14/2021 2246 by Sid Barton RN  Outcome: Ongoing  10/14/2021 1605 by Jeannine Britt RN  Outcome: Ongoing     Problem: Infection:  Goal: Will remain free from infection  Description: Will remain free from infection  10/14/2021 2246 by Sid Barton RN  Outcome: Ongoing  10/14/2021 1605 by Jeannine Britt RN  Outcome: Ongoing     Problem: Safety:  Goal: Free from accidental physical injury  Description: Free from accidental physical injury  10/14/2021 2246 by Sid Barton RN  Outcome: Ongoing  10/14/2021 1605 by Jeannine Britt RN  Outcome: Ongoing  Goal: Free from intentional harm  Description: Free from intentional harm  10/14/2021 2246 by Sid Barton RN  Outcome: Ongoing  10/14/2021 1605 by Jeannine Britt RN  Outcome: Ongoing

## 2021-10-15 NOTE — DISCHARGE SUMMARY
[facility]  Discharge Summary    Maxi Macedo  :  1960  MRN:  [id number]    Admit date:  10/14/2021  Discharge date:      Admitting Physician:  Phong Mtz MD    Discharge Diagnoses:      1. S/P successful isolation of the pulmonary veins for ablation of paroxysmal atrial fibrillation        Patient Active Problem List   Diagnosis    At[facility] fibrillation with controlled ventricular response ([facility]    Ulcerative colitis, chronic (HCC)    HTN (hypertension)    Atrial fibrillation (HCC)    Ulcerative colitis ([facility]    Familial tremor    Family history of coronary artery disease    S/P ablation of atrial fibrillation       Admission Condition:  fair    Discharged Condition:  good    Hospital Course:   Patient admitted post ablation of paroxysmal atrial fibrillation despite medical therapy for observation. Patient tolerated the procedure and post procedure stay was uneventful. Patient was stable for discharge to be followed as an out paitent    Current Discharge Medication List           Details   apixaban (ELIQUIS) 5 MG TABS tablet Take 1 tablet by mouth 2 times daily  Qty: 60 tablet, Refills: 2      pantoprazole (PROTONIX) 40 MG tablet Take 1 tablet by mouth daily  Qty: 30 tablet, Refills: 0              Details   propranolol (INDERAL LA) 60 MG extended release capsule Take 1 capsule by mouth daily  Qty: 90 capsule, Refills: 3      propafenone (RYTHMOL SR) 225 MG extended release capsule Take 1 capsule by mouth 2 times daily  Qty: 180 capsule, Refills: 1      lisinopril (PRINIVIL;ZESTRIL) 5 MG tablet Take 1 tablet by mouth daily. Qty: 30 tablet, Refills: 6    Associated Diagnoses: HTN (hypertension); Atrial fibrillation ([facility] Ulcerative colitis ([facility] Familial tremor; Ulcerative colitis, chronic (HCC)      citalopram (CELEXA) 20 MG tablet Take 20 mg by mouth daily. Multiple Vitamin (MULTIVITAMIN PO) Take 1 tablet by mouth daily.                Discharge Exam:  /68   Pulse 64 Temp 97.9 °F (36.6 °C) (Oral)   Resp 17   Ht 6' 3\" (1.905 m)   Wt 224 lb 13.9 oz (102 kg)   SpO2 95%   BMI 28.11 kg/m²   General appearance: alert, appears stated age and cooperative  Head: Normocephalic, without obvious abnormality, atraumatic  Lungs: clear to auscultation bilaterally  Heart: regular rate and rhythm, S1, S2 normal, no murmur, click, rub or gallop  Extremities: extremities normal, atraumatic, no cyanosis or edema  Pulses: 2+ and symmetric  Skin: Skin color, texture, turgor normal. No rashes or lesions. Bilateral femoral groin sites soft, nontender. No hematoma.  Minimal bruising  Neurologic: Grossly normal    Disposition:   home    Signed:  WILBER Jean CNP  10/15/2021, 9:01 AM

## 2021-10-21 ENCOUNTER — OFFICE VISIT (OUTPATIENT)
Dept: CARDIOLOGY CLINIC | Age: 61
End: 2021-10-21
Payer: COMMERCIAL

## 2021-10-21 VITALS
HEART RATE: 72 BPM | WEIGHT: 227.6 LBS | BODY MASS INDEX: 28.3 KG/M2 | DIASTOLIC BLOOD PRESSURE: 66 MMHG | HEIGHT: 75 IN | SYSTOLIC BLOOD PRESSURE: 110 MMHG

## 2021-10-21 DIAGNOSIS — I10 PRIMARY HYPERTENSION: ICD-10-CM

## 2021-10-21 DIAGNOSIS — Z86.79 S/P ABLATION OF ATRIAL FIBRILLATION: Primary | ICD-10-CM

## 2021-10-21 DIAGNOSIS — Z98.890 S/P ABLATION OF ATRIAL FIBRILLATION: Primary | ICD-10-CM

## 2021-10-21 PROCEDURE — 99214 OFFICE O/P EST MOD 30 MIN: CPT | Performed by: NURSE PRACTITIONER

## 2021-10-21 PROCEDURE — 93000 ELECTROCARDIOGRAM COMPLETE: CPT | Performed by: NURSE PRACTITIONER

## 2021-10-21 ASSESSMENT — ENCOUNTER SYMPTOMS
EYE REDNESS: 0
SINUS PAIN: 0
VOMITING: 0
WHEEZING: 0
ABDOMINAL PAIN: 0
ABDOMINAL DISTENTION: 0
EYE ITCHING: 0
SINUS PRESSURE: 0
NAUSEA: 0
COUGH: 0
DIARRHEA: 0
BLOOD IN STOOL: 0
SHORTNESS OF BREATH: 0
BACK PAIN: 0
CHEST TIGHTNESS: 0
CONSTIPATION: 0
COLOR CHANGE: 0

## 2021-10-21 NOTE — PROGRESS NOTES
Electrophysiology Follow up Note      Reason for consultation:  Atrial fibrillation    Chief complaint: 1 week follow up on ablation of atrial fibrillation    Referring physician:       Primary care physician: Oniel Obando      History of Present Illness: This visit 10/21/2021  Patient is here today for 1 week follow-up status post ablation of atrial fibrillation. Patient reports he has been feeling well. He states that he does have some intermittent chest discomfort but states that it is getting better. He denies palpitations, shortness of breath, lightheadedness, dizziness, edema or syncope. He states that his femoral groin sites have healed well    Previous visit  Patient is a 65-year-old male with a history of hypertension, paroxysmal atrial fibrillation was referred to the office by Dr. Marquis Barrios for atrial fibrillation management. Patient reports he has had on and off A. fib for quite some time and had been cardioverted several times up to 5-6 times and he has been on Rythmol and he had breakthrough episodes. When he is in atrial fibrillation patient does not feel good and he has tiredness and weakness and short of breath and he is not able to do much work. When he is not in A. fib he feels good. Patient denies any chest pain, palpitations, shortness of breath, edema or dizziness. Patient drinks decaf coffee. Patient denies alcohol        Pastmedical history:   Past Medical History:   Diagnosis Date    Atrial fibrillation [facility]) 2006 7/23/12 Successful direct current cardioversion    Encounter for cardioversion procedure 07/06/2021    sucessful    Familial tremor     Family history of coronary artery disease     H/O 24 hour EKG monitoring 04/06/2006    The 48 holter monitor reveals the patient in SR with rare ventricular ectopic beat and rare supraventricular ectopic beat.     H/O cardiovascular stress test 06/20/2012    Probably normal perfusion Lexiscan Cardiolite study except for diaphragmatic artifact. Low-normal LV function, EF 50%    H/O echocardiogram 06/20/2012    Mild left atrial enlargement and right ventricular enlargement. Left ventricular hypertrophy with global hypokinesis and moderately reduced LV function. EF 35-40%  Mild pulmomary HTN. Mild TR    H/O transesophageal echocardiography ([facility]) for monitoring 06/20/2012    Other than mild MR and TR, no other significant abnormalities seen. There is no intracavitary mass or thrombus, there is no atrial septal defect or patent foramen ovale    History of Doppler echocardiogram 10/05/2017    Normal left ventricle structure and function. Ejection fraction is low normal, visually estimated at 50-55%. No significant valvular disease noted. No evidence of pericardial effusion. Unremarkable echo.  History of exercise stress test 10/05/2017    treadmill    History of transesophageal echocardiography ([facility]) 07/06/2021    No thrombus noted in the left atrial appendage. Negative bubble study. No PFO or ASD noted.  HTN (hypertension)     Pre-op evaluation     Ulcerative colitis ([address]        Surgical history :   Past Surgical History:   Procedure Laterality Date    ABLATION OF DYSRHYTHMIC FOCUS  10/14/2021    atrial fibrillation ablation    COLECTOMY      PROCTECTOMY         Family history:   Family History   Problem Relation Age of Onset    Heart Disease Mother     High Blood Pressure Mother     Heart Disease Father     Birth Defects Maternal Grandmother        Social history :  reports that he quit smoking about 20 years ago. He has never used smokeless tobacco. He reports current alcohol use. He reports that he does not use drugs.     No Known Allergies    Current Outpatient Medications on File Prior to Visit   Medication Sig Dispense Refill    apixaban (ELIQUIS) 5 MG TABS tablet Take 1 tablet by mouth 2 times daily 60 tablet 2    pantoprazole (PROTONIX) 40 MG tablet Take 1 tablet by mouth daily 30 tablet 0    propranolol (INDERAL LA) 60 MG extended release capsule Take 1 capsule by mouth daily 90 capsule 3    propafenone (RYTHMOL SR) 225 MG extended release capsule Take 1 capsule by mouth 2 times daily 180 capsule 1    lisinopril (PRINIVIL;ZESTRIL) 5 MG tablet Take 1 tablet by mouth daily. 30 tablet 6    citalopram (CELEXA) 20 MG tablet Take 20 mg by mouth daily.  Multiple Vitamin (MULTIVITAMIN PO) Take 1 tablet by mouth daily. No current facility-administered medications on file prior to visit. Review of Systems:   Review of Systems   Constitutional: Negative for activity change, chills, fatigue and fever. HENT: Negative for congestion, sinus pressure and sinus pain. Eyes: Negative for redness, itching and visual disturbance. Respiratory: Negative for cough, chest tightness, shortness of breath and wheezing. Cardiovascular: Positive for chest pain. Negative for palpitations and leg swelling. Gastrointestinal: Negative for abdominal distention, abdominal pain, blood in stool, constipation, diarrhea, nausea and vomiting. Endocrine: Negative for cold intolerance and heat intolerance. Genitourinary: Negative for difficulty urinating, dysuria and hematuria. Musculoskeletal: Positive for arthralgias. Negative for back pain. Skin: Negative for color change and rash. Allergic/Immunologic: Negative for food allergies. Neurological: Negative for dizziness, syncope, light-headedness, numbness and headaches. Hematological: Does not bruise/bleed easily. Psychiatric/Behavioral: Negative for agitation, behavioral problems and confusion. The patient is not nervous/anxious. Examination:      Vitals:    10/21/21 0947   BP: 110/66   Pulse: 72   Weight: 227 lb 9.6 oz (103.2 kg)   Height: 6' 3\" (1.905 m)        Body mass index is 28.45 kg/m². Physical Exam  Constitutional:       Appearance: Normal appearance. He is not ill-appearing. HENT:      Head: Normocephalic and atraumatic. Right Ear: External ear normal.      Left Ear: External ear normal.      Nose: No congestion or rhinorrhea. Mouth/Throat:      Mouth: Mucous membranes are moist.      Pharynx: No oropharyngeal exudate or posterior oropharyngeal erythema. Eyes:      General:         Right eye: No discharge. Left eye: No discharge. Conjunctiva/sclera: Conjunctivae normal.   Cardiovascular:      Rate and Rhythm: Normal rate and regular rhythm. Heart sounds: No murmur heard. Pulmonary:      Effort: Pulmonary effort is normal.      Breath sounds: No wheezing or rhonchi. Abdominal:      General: Abdomen is flat. There is no distension. Palpations: Abdomen is soft. Tenderness: There is no abdominal tenderness. Musculoskeletal:         General: Normal range of motion. Cervical back: Normal range of motion. Right lower leg: No edema. Left lower leg: No edema. Skin:     General: Skin is warm and dry. Comments: Bilateral femoral groin site soft nontender no hematoma   Neurological:      General: No focal deficit present. Mental Status: He is alert and oriented to person, place, and time. Psychiatric:         Mood and Affect: Mood normal.         Thought Content:  Thought content normal.       CBC:   Lab Results   Component Value Date    WBC 11.9 10/15/2021    HGB 13.3 10/15/2021    HCT 41.3 10/15/2021     10/15/2021     Lipids:  Lab Results   Component Value Date    CHOL 176 09/14/2011    TRIG 55 09/14/2011    HDL 56 (L) 09/14/2011    LDLDIRECT 122 (H) 09/14/2011     PT/INR:   Lab Results   Component Value Date    INR 0.95 10/11/2021        BMP:    Lab Results   Component Value Date     10/15/2021    K 3.7 10/15/2021     10/15/2021    CO2 25 10/15/2021    BUN 8 10/15/2021     CMP:   Lab Results   Component Value Date    AST 32 07/22/2012    PROT 7.1 07/22/2012    BILITOT 0.8 07/22/2012    ALKPHOS 61

## 2021-11-19 ENCOUNTER — OFFICE VISIT (OUTPATIENT)
Dept: CARDIOLOGY CLINIC | Age: 61
End: 2021-11-19
Payer: COMMERCIAL

## 2021-11-19 VITALS
SYSTOLIC BLOOD PRESSURE: 110 MMHG | HEIGHT: 75 IN | WEIGHT: 230.8 LBS | OXYGEN SATURATION: 99 % | HEART RATE: 64 BPM | DIASTOLIC BLOOD PRESSURE: 64 MMHG | BODY MASS INDEX: 28.7 KG/M2

## 2021-11-19 DIAGNOSIS — I48.91 ATRIAL FIBRILLATION WITH CONTROLLED VENTRICULAR RESPONSE (HCC): ICD-10-CM

## 2021-11-19 DIAGNOSIS — Z86.79 S/P ABLATION OF ATRIAL FIBRILLATION: Primary | ICD-10-CM

## 2021-11-19 DIAGNOSIS — Z98.890 S/P ABLATION OF ATRIAL FIBRILLATION: Primary | ICD-10-CM

## 2021-11-19 DIAGNOSIS — I10 PRIMARY HYPERTENSION: ICD-10-CM

## 2021-11-19 PROCEDURE — 93000 ELECTROCARDIOGRAM COMPLETE: CPT | Performed by: INTERNAL MEDICINE

## 2021-11-19 PROCEDURE — 99213 OFFICE O/P EST LOW 20 MIN: CPT | Performed by: INTERNAL MEDICINE

## 2021-11-19 ASSESSMENT — ENCOUNTER SYMPTOMS
CONSTIPATION: 0
NAUSEA: 0
BACK PAIN: 0
BLOOD IN STOOL: 0
ABDOMINAL PAIN: 0
COUGH: 0
VOMITING: 0
WHEEZING: 0
CHEST TIGHTNESS: 0
EYE PAIN: 0
SHORTNESS OF BREATH: 0
PHOTOPHOBIA: 0
COLOR CHANGE: 0
DIARRHEA: 0

## 2021-11-19 NOTE — PROGRESS NOTES
Electrophysiology FU  Note      Reason for consultation:  Atrial fibrillation    Chief complaint : Ablation follow-up    Referring physician:       Primary care physician: Samuel Briones      History of Present Illness: This visit (11/19/2021)      Chief Complaint   Patient presents with    Follow-up post ablation     Dr. Melissa Valenzuela pt, discuss left atrial appendage. chest discomfort at times on right side but getting better. 1 pot of decafe coffee, 3-4 beers a week. Pt denies smoking. Exercises 3-4 times a week. Previous visit:    Patient is a 39-year-old male with a history of hypertension, paroxysmal atrial fibrillation was referred to the office by Dr. Vivienne Denis for atrial fibrillation management. Patient reports he has had on and off A. fib for quite some time and had been cardioverted several times up to 5-6 times and he has been on Rythmol and he had breakthrough episodes. When he is in atrial fibrillation patient does not feel good and he has tiredness and weakness and short of breath and he is not able to do much work. When he is not in A. fib he feels good. Patient denies any chest pain, palpitations, shortness of breath, edema or dizziness. Patient drinks decaf coffee. Patient denies alcohol        Pastmedical history:   Past Medical History:   Diagnosis Date    Atrial fibrillation [facility]) 2006 7/23/12 Successful direct current cardioversion    Encounter for cardioversion procedure 07/06/2021    sucessful    Familial tremor     Family history of coronary artery disease     H/O 24 hour EKG monitoring 04/06/2006    The 48 holter monitor reveals the patient in SR with rare ventricular ectopic beat and rare supraventricular ectopic beat.  H/O cardiovascular stress test 06/20/2012    Probably normal perfusion Lexiscan Cardiolite study except for diaphragmatic artifact.   Low-normal LV function, EF 50%    H/O echocardiogram capsule 3    propafenone (RYTHMOL SR) 225 MG extended release capsule Take 1 capsule by mouth 2 times daily 180 capsule 1    lisinopril (PRINIVIL;ZESTRIL) 5 MG tablet Take 1 tablet by mouth daily. 30 tablet 6    citalopram (CELEXA) 20 MG tablet Take 20 mg by mouth daily.  Multiple Vitamin (MULTIVITAMIN PO) Take 1 tablet by mouth daily. No current facility-administered medications on file prior to visit. Review of Systems:   Review of Systems   Constitutional: Negative for activity change, chills, fatigue and fever. HENT: Negative for congestion, ear pain and tinnitus. Eyes: Negative for photophobia, pain and visual disturbance. Respiratory: Negative for cough, chest tightness, shortness of breath and wheezing. Cardiovascular: Negative for chest pain, palpitations and leg swelling. Gastrointestinal: Negative for abdominal pain, blood in stool, constipation, diarrhea, nausea and vomiting. Endocrine: Negative for cold intolerance and heat intolerance. Genitourinary: Negative for dysuria, flank pain and hematuria. Musculoskeletal: Positive for arthralgias. Negative for back pain, myalgias and neck stiffness. Skin: Negative for color change and rash. Allergic/Immunologic: Negative for food allergies. Neurological: Negative for dizziness, light-headedness, numbness and headaches. Hematological: Does not bruise/bleed easily. Psychiatric/Behavioral: Negative for agitation, behavioral problems and confusion. Examination:      Vitals:    11/19/21 1154 11/19/21 1201   BP: (!) 140/70 110/64   Pulse: 64    SpO2: 99%    Weight: 230 lb 12.8 oz (104.7 kg)    Height: 6' 3\" (1.905 m)         Body mass index is 28.85 kg/m². Physical Exam  Constitutional:       Appearance: Normal appearance. He is not ill-appearing. HENT:      Head: Normocephalic and atraumatic.       Mouth/Throat:      Mouth: Mucous membranes are moist.   Eyes:      Conjunctiva/sclera: Conjunctivae with the patient about quitting alcohol and caffeine otherwise patient will have a high risk of having recurrence of atrial fibrillation and patient voiced understanding    Discussed with the patient about PACs from the left atrial appendage. Discussed with the patient that during the EP study patient had a lot of PACs post ablation coming from the left atrial appendage but we did not ablate the area because of the high risk nature of bleeding inside the left atrial appendage. Patient voiced understanding. Patient does not have any PACs now and is in sinus rhythm. Follow-up in 2 months and if he has no arrhythmia then week she could come off Rythmol  Given the chads Vas Score is 1 we can change from Eliquis to aspirin      Thanks again for allowing me to participate in care of this patient. Please call me if you have any questions. With best regards. Fermín Mitchell MD, 11/19/2021 12:07 PM     Please note this report has been partially produced using speech recognition software and may contain errors related to that system including errors in grammar, punctuation, and spelling, as well as words and phrases that may be inappropriate. If there are any questions or concerns please feel free to contact the dictating provider for clarification.

## 2021-12-20 RX ORDER — PROPAFENONE HYDROCHLORIDE 225 MG/1
225 CAPSULE, EXTENDED RELEASE ORAL 2 TIMES DAILY
Qty: 180 CAPSULE | Refills: 1 | Status: SHIPPED | OUTPATIENT
Start: 2021-12-20 | End: 2022-02-16 | Stop reason: ALTCHOICE

## 2022-02-11 ENCOUNTER — TELEPHONE (OUTPATIENT)
Dept: CARDIOLOGY CLINIC | Age: 62
End: 2022-02-11

## 2022-02-16 ENCOUNTER — OFFICE VISIT (OUTPATIENT)
Dept: CARDIOLOGY CLINIC | Age: 62
End: 2022-02-16
Payer: COMMERCIAL

## 2022-02-16 VITALS
SYSTOLIC BLOOD PRESSURE: 108 MMHG | WEIGHT: 237.6 LBS | DIASTOLIC BLOOD PRESSURE: 72 MMHG | HEIGHT: 75 IN | HEART RATE: 56 BPM | BODY MASS INDEX: 29.54 KG/M2

## 2022-02-16 DIAGNOSIS — I48.91 ATRIAL FIBRILLATION WITH CONTROLLED VENTRICULAR RESPONSE (HCC): ICD-10-CM

## 2022-02-16 DIAGNOSIS — Z98.890 S/P ABLATION OF ATRIAL FIBRILLATION: Primary | ICD-10-CM

## 2022-02-16 DIAGNOSIS — Z86.79 S/P ABLATION OF ATRIAL FIBRILLATION: Primary | ICD-10-CM

## 2022-02-16 PROCEDURE — 93000 ELECTROCARDIOGRAM COMPLETE: CPT | Performed by: INTERNAL MEDICINE

## 2022-02-16 PROCEDURE — 99213 OFFICE O/P EST LOW 20 MIN: CPT | Performed by: INTERNAL MEDICINE

## 2022-02-16 RX ORDER — ASPIRIN 81 MG/1
81 TABLET ORAL DAILY
Qty: 90 TABLET | Refills: 1 | Status: SHIPPED | OUTPATIENT
Start: 2022-02-16

## 2022-02-16 ASSESSMENT — ENCOUNTER SYMPTOMS
BLOOD IN STOOL: 0
EYE PAIN: 0
COUGH: 0
ABDOMINAL PAIN: 0
DIARRHEA: 0
PHOTOPHOBIA: 0
CHEST TIGHTNESS: 0
NAUSEA: 0
SHORTNESS OF BREATH: 0
COLOR CHANGE: 0
VOMITING: 0
BACK PAIN: 0
WHEEZING: 0
CONSTIPATION: 0

## 2022-02-16 NOTE — PATIENT INSTRUCTIONS
Please be informed that if you contact our office outside of normal business hours the physician on call cannot help with any scheduling or rescheduling issues, procedure instruction questions or any type of medication issue. We advise you for any urgent/emergency that you go to the nearest emergency room! PLEASE CALL OUR OFFICE DURING NORMAL BUSINESS HOURS    Monday - Friday   8 am to 5 pm    [place] [facility]: [phone]    [place]:  [phone]    **It is YOUR responsibilty to bring medication bottles and/or updated medication list to [address] [place] [address]. This will allow us to better serve you and all your healthcare needs**    [facility] Locations - No appointment necessary. Sites open Monday to Friday. Call your preferred location for test preparation, business hours and other information you need. [employer] accepts [employer]. [facility] [address] Rose [place], [address] [facility]  Phone: [phone] [facility] [address] [facility]  [address].   Latasha hammond, [address]  Phone: [phone]

## 2022-02-16 NOTE — PROGRESS NOTES
Electrophysiology FU  Note      Reason for consultation:  Atrial fibrillation    Chief complaint : Ablation follow-up    Referring physician:       Primary care physician: Balwinder Olivia      History of Present Illness: This visit (2/16/2022)      Chief Complaint   Patient presents with    Follow-up     Pt denies any cardiac symptoms. Pt does drink decaf. Pt does drink alcohol occ. Pt doesn't smoke. Pt does exercise            Previous visit:(11/19/2021)      Chief Complaint   Patient presents with    Follow-up post ablation     Dr. Romero Batres pt, discuss left atrial appendage. chest discomfort at times on right side but getting better. 1 pot of decafe coffee, 3-4 beers a week. Pt denies smoking. Exercises 3-4 times a week. Previous visit:    Patient is a 43-year-old male with a history of hypertension, paroxysmal atrial fibrillation was referred to the office by Dr. Lisa Doss for atrial fibrillation management. Patient reports he has had on and off A. fib for quite some time and had been cardioverted several times up to 5-6 times and he has been on Rythmol and he had breakthrough episodes. When he is in atrial fibrillation patient does not feel good and he has tiredness and weakness and short of breath and he is not able to do much work. When he is not in A. fib he feels good. Patient denies any chest pain, palpitations, shortness of breath, edema or dizziness. Patient drinks decaf coffee.   Patient denies alcohol        Pastmedical history:   Past Medical History:   Diagnosis Date    Atrial fibrillation [facility]) 2006 7/23/12 Successful direct current cardioversion    Encounter for cardioversion procedure 07/06/2021    sucessful    Familial tremor     Family history of coronary artery disease     H/O 24 hour EKG monitoring 04/06/2006    The 48 holter monitor reveals the patient in SR with rare ventricular ectopic beat and rare supraventricular ectopic beat.  H/O cardiovascular stress test 06/20/2012    Probably normal perfusion Lexiscan Cardiolite study except for diaphragmatic artifact. Low-normal LV function, EF 50%    H/O echocardiogram 06/20/2012    Mild left atrial enlargement and right ventricular enlargement. Left ventricular hypertrophy with global hypokinesis and moderately reduced LV function. EF 35-40%  Mild pulmomary HTN. Mild TR    H/O transesophageal echocardiography ([facility]) for monitoring 06/20/2012    Other than mild MR and TR, no other significant abnormalities seen. There is no intracavitary mass or thrombus, there is no atrial septal defect or patent foramen ovale    History of Doppler echocardiogram 10/05/2017    Normal left ventricle structure and function. Ejection fraction is low normal, visually estimated at 50-55%. No significant valvular disease noted. No evidence of pericardial effusion. Unremarkable echo.  History of exercise stress test 10/05/2017    treadmill    History of transesophageal echocardiography ([facility]) 07/06/2021    No thrombus noted in the left atrial appendage. Negative bubble study. No PFO or ASD noted.  HTN (hypertension)     Pre-op evaluation     Ulcerative colitis ([address]        Surgical history :   Past Surgical History:   Procedure Laterality Date    ABLATION OF DYSRHYTHMIC FOCUS  10/14/2021    atrial fibrillation ablation    COLECTOMY      PROCTECTOMY         Family history:   Family History   Problem Relation Age of Onset    Heart Disease Mother     High Blood Pressure Mother     Heart Disease Father     Birth Defects Maternal Grandmother        Social history :  reports that he quit smoking about 20 years ago. He has never used smokeless tobacco. He reports current alcohol use. He reports that he does not use drugs.     No Known Allergies    Current Outpatient Medications on File Prior to Visit   Medication Sig Dispense Refill    propafenone (RYTHMOL SR) 225 MG extended release capsule Take 1 capsule by mouth 2 times daily 180 capsule 1    apixaban (ELIQUIS) 5 MG TABS tablet Take 1 tablet by mouth 2 times daily 60 tablet 2    pantoprazole (PROTONIX) 40 MG tablet Take 1 tablet by mouth daily 30 tablet 0    propranolol (INDERAL LA) 60 MG extended release capsule Take 1 capsule by mouth daily 90 capsule 3    lisinopril (PRINIVIL;ZESTRIL) 5 MG tablet Take 1 tablet by mouth daily. 30 tablet 6    citalopram (CELEXA) 20 MG tablet Take 20 mg by mouth daily.  Multiple Vitamin (MULTIVITAMIN PO) Take 1 tablet by mouth daily. No current facility-administered medications on file prior to visit. Review of Systems:   Review of Systems   Constitutional: Negative for activity change, chills, fatigue and fever. HENT: Negative for congestion, ear pain and tinnitus. Eyes: Negative for photophobia, pain and visual disturbance. Respiratory: Negative for cough, chest tightness, shortness of breath and wheezing. Cardiovascular: Negative for chest pain, palpitations and leg swelling. Gastrointestinal: Negative for abdominal pain, blood in stool, constipation, diarrhea, nausea and vomiting. Endocrine: Negative for cold intolerance and heat intolerance. Genitourinary: Negative for dysuria, flank pain and hematuria. Musculoskeletal: Positive for arthralgias. Negative for back pain, myalgias and neck stiffness. Skin: Negative for color change and rash. Allergic/Immunologic: Negative for food allergies. Neurological: Negative for dizziness, light-headedness, numbness and headaches. Hematological: Does not bruise/bleed easily. Psychiatric/Behavioral: Negative for agitation, behavioral problems and confusion. Examination:      Vitals:    02/16/22 1432   BP: 108/72   Pulse: 56   Weight: 237 lb 9.6 oz (107.8 kg)   Height: 6' 3\" (1.905 m)        Body mass index is 29.7 kg/m². Physical Exam  Constitutional:       Appearance: Normal appearance.  He is not ill-appearing. HENT:      Head: Normocephalic and atraumatic. Mouth/Throat:      Mouth: Mucous membranes are moist.   Eyes:      Conjunctiva/sclera: Conjunctivae normal.   Cardiovascular:      Rate and Rhythm: Regular rhythm. Bradycardia present. Heart sounds: No murmur heard. Pulmonary:      Effort: Pulmonary effort is normal.      Breath sounds: No rales. Abdominal:      General: Abdomen is flat. Palpations: Abdomen is soft. Musculoskeletal:         General: No tenderness. Normal range of motion. Cervical back: Normal range of motion. Right lower leg: No edema. Left lower leg: No edema. Skin:     General: Skin is warm and dry. Neurological:      General: No focal deficit present. Mental Status: He is alert and oriented to person, place, and time.                CBC:   Lab Results   Component Value Date    WBC 11.9 10/15/2021    HGB 13.3 10/15/2021    HCT 41.3 10/15/2021     10/15/2021     Lipids:  Lab Results   Component Value Date    CHOL 176 09/14/2011    TRIG 55 09/14/2011    HDL 56 (L) 09/14/2011    LDLDIRECT 122 (H) 09/14/2011     PT/INR:   Lab Results   Component Value Date    INR 0.95 10/11/2021        BMP:    Lab Results   Component Value Date     10/15/2021    K 3.7 10/15/2021     10/15/2021    CO2 25 10/15/2021    BUN 8 10/15/2021     CMP:   Lab Results   Component Value Date    AST 32 07/22/2012    PROT 7.1 07/22/2012    BILITOT 0.8 07/22/2012    ALKPHOS 61 07/22/2012     TSH:  No results found for: TSH    EKGINTERPRETATION - EKG Interpretation:  Sinus rhythm, repolarization variant  - seen before in EKG      IMPRESSION / RECOMMENDATIONS:     Atrial fibrillation paroxysmal sp PV isolation  Hypertension on lisinopril  History of ulcerative colitis  Family history of coronary artery disease  Essential tremors on propranolol    Patient since ablation reports he is doing well  Patient denies any arrhythmia since ablation  Has been close to 3 months now so we will stop Rythmol. Change Eliquis to aspirin as chads Vas score is only 1    Patient is appreciative of care     Follow-up in 6 months or earlier with symptoms    Recommend to refrain from caffeine and alcohol    Thanks again for allowing me to participate in care of this patient. Please call me if you have any questions. With best regards. Adalberto Santoyo MD, 2/16/2022 2:51 PM     Please note this report has been partially produced using speech recognition software and may contain errors related to that system including errors in grammar, punctuation, and spelling, as well as words and phrases that may be inappropriate. If there are any questions or concerns please feel free to contact the dictating provider for clarification.

## 2022-04-14 ENCOUNTER — OFFICE VISIT (OUTPATIENT)
Dept: CARDIOLOGY CLINIC | Age: 62
End: 2022-04-14
Payer: COMMERCIAL

## 2022-04-14 VITALS
DIASTOLIC BLOOD PRESSURE: 78 MMHG | HEIGHT: 75 IN | SYSTOLIC BLOOD PRESSURE: 118 MMHG | HEART RATE: 59 BPM | WEIGHT: 235 LBS | BODY MASS INDEX: 29.22 KG/M2

## 2022-04-14 DIAGNOSIS — Z98.890 S/P ABLATION OF ATRIAL FIBRILLATION: Primary | ICD-10-CM

## 2022-04-14 DIAGNOSIS — Z82.49 FAMILY HISTORY OF CORONARY ARTERY DISEASE: ICD-10-CM

## 2022-04-14 DIAGNOSIS — Z86.79 S/P ABLATION OF ATRIAL FIBRILLATION: Primary | ICD-10-CM

## 2022-04-14 DIAGNOSIS — I48.0 PAROXYSMAL ATRIAL FIBRILLATION (HCC): ICD-10-CM

## 2022-04-14 DIAGNOSIS — I10 PRIMARY HYPERTENSION: ICD-10-CM

## 2022-04-14 PROCEDURE — 99214 OFFICE O/P EST MOD 30 MIN: CPT | Performed by: INTERNAL MEDICINE

## 2022-04-14 PROCEDURE — 93000 ELECTROCARDIOGRAM COMPLETE: CPT | Performed by: INTERNAL MEDICINE

## 2022-04-14 NOTE — PATIENT INSTRUCTIONS
CORONARY ARTERY DISEASE:None known. Current chest pain which is atypical & getting better could be related to COVID. ETT 10/2017   Excellent exercise capacity. 11 METs work load.    Physiological BP response to exercise.    ETT non diagnostic as THR is not achieved even at high work load.     EKG: NSR, early repolarization.     HTN:Yes  well controlled on current medical regimen, see list above. - changes in  treatment:   no   [facility]:No  [facility]  [facility]:no              HH significant VHD noted  ECHO 10/2017   Normal left ventricle structure and function.   Ejection fraction is low normal, visually estimated at 50-55%.   No significant valvular disease noted.   No evidence of pericardial effusion.   Unremarkable echo. TE-Echo 7/2021   There was no thrombus noted in the left atrial appendage.   Negative bubble study. No PFO or ASD noted.   No significant valvular disease noted.   Successful cardioversion using 200 joules    DYSLIPIDEMIA: none known    ARRHYTHMIAS:yes, CHADS is 1  Atrial fibrillation S/P Cardioversion again. He is rate controlled & on anticoagulation. CHADS score is only one. Stay on Rythmol. Patient to see Cathy Marmolejo for possible ablation because of recurrent episodes of PAF. TESTS ORDERED: Echo     PREVIOUSLY ORDERED TESTS REVIEWED & DISCUSSED WITH THE PATIENT:     I personally reviewed & interpreted, all previously ordered tests as copied above. Latest Labs are pulled in to the note with dates. Labs, specially in Reference to Lipid profile, Cardiac testing in the form of Echo ( dated: ), stress tests ( dated: ) & other relevant cardiac testing reviewed with patient & recommendations made based on assessment of the results. Discussed role of Cardiac risk factors & effects + treatment of co morbidities with patient & advised accordingly.      MEDICATIONS: List of medications patient is currently taking is reviewed in detail with the patient & family member present. Discussed any side effects or problems taking the medication. Recommend Continue present management & medications as listed. AFFIRMATION: I  reviewed patient's history, previous & current medical problems & all Labs + testing. This includes chart prep even prior to the vosit. Various goals are discussed and multiple questions answered. Relevant concelling performed. Office follow up in six months.

## 2022-04-14 NOTE — LETTER
[address]  [address] [address]  Phone: [phone]  Fax: [phone]    Bridgette Nicole MD    April 14, 2022     Zahida Granado  [address] [place] [address]    Patient: Shayy Vazquez   MR Number: [id number]   YOB: 1960   Date of Visit: 4/14/2022       Dear Zahida Granado: Thank you for referring Shayy Vazquez to me for evaluation/treatment. Below are the relevant portions of my assessment and plan of care. If you have questions, please do not hesitate to call me. I look forward to following Blase Lynette along with you.     Sincerely,      Bridgette Nicole MD

## 2022-04-14 NOTE — PROGRESS NOTES
OFFICE PROGRESS NOTES      Sam Fraser is a 57 y.o. male who has    CHIEF COMPLAINT AS FOLLOWS:  CHEST PAIN: C/O chest pains at this time.     1. When did it began:2 weeks ago  2. How long does it last: there all the time, getting better. Had had sinus infection prior to that. 3. How severe:getting better  4. Radiation:no  5. Aggravating factors:no  6. Relieving factors:no  7. Associated features:SOB  8. Tenderness to palpation:no   SOB: C/O SOB with exertion.              LEG EDEMA: No leg edema   PALPITATIONS: Denies any C/O Palpitations   DIZZINESS: No C/O Dizziness   SYNCOPE: None   OTHER/ ADDITIONAL COMPLAINTS: Patient had COVID in January. HPI: Patient is here for F/U on his chest pain, PAF-Arrhythmia, HTN & Dyslipidemia problems. Arrhythmia: Patient has known  PAF. HTN: Patient has known essential HTN. Has been treated with guideline recommended medical / physical/ diet therapy as stated below. Dyslipidemia: Patient has known mixed dyslipidemia. Has been treated with guideline recommended medical / physical/ diet therapy as stated below. Current Outpatient Medications   Medication Sig Dispense Refill    Omega-3 Fatty Acids (FISH OIL PO) Take by mouth      aspirin EC 81 MG EC tablet Take 1 tablet by mouth daily 90 tablet 1    propranolol (INDERAL LA) 60 MG extended release capsule Take 1 capsule by mouth daily 90 capsule 3    lisinopril (PRINIVIL;ZESTRIL) 5 MG tablet Take 1 tablet by mouth daily. 30 tablet 6    citalopram (CELEXA) 20 MG tablet Take 40 mg by mouth daily       Multiple Vitamin (MULTIVITAMIN PO) Take 1 tablet by mouth daily. No current facility-administered medications for this visit. Allergies: Patient has no known allergies.   Review of Systems:    Constitutional: Negative for diaphoresis and fatigue  Respiratory: Negative for shortness of breath  Cardiovascular: Negative for chest pain, dyspnea on exertion, claudication, edema, irregular heartbeat, murmur, palpitations or shortness of breath  Musculoskeletal: Negative for muscle pain, muscular weakness, negative for pain in arm and leg or swelling in foot and leg    Objective:  /78   Pulse 59   Ht 6' 3\" (1.905 m)   Wt 235 lb (106.6 kg)   BMI 29.37 kg/m²   Wt Readings from Last 3 Encounters:   04/14/22 235 lb (106.6 kg)   02/16/22 237 lb 9.6 oz (107.8 kg)   11/19/21 230 lb 12.8 oz (104.7 kg)     Body mass index is 29.37 kg/m². GENERAL - Alert, oriented, pleasant, in no apparent distress. EYES: No jaundice, no conjunctival pallor. Neck - Supple. No jugular venous distention noted. No carotid bruits. Cardiovascular  Normal S1 and S2 without obvious murmur or gallop. Extremities - No cyanosis, clubbing, or significant edema. Pulmonary  No respiratory distress. No wheezes or rales.       MEDICAL DECISION MAKING & DATA REVIEW:    Lab Review   No results found for: TROPONINT  Lab Results   Component Value Date    BNP 19 07/22/2012     Lab Results   Component Value Date    INR 0.95 10/11/2021    INR 1.10 03/01/2021     No results found for: LABA1C  Lab Results   Component Value Date    WBC 11.9 (H) 10/15/2021    WBC 6.9 10/11/2021    HCT 41.3 (L) 10/15/2021    HCT 44.0 10/14/2021    MCV 95.8 10/15/2021    MCV 94.8 10/11/2021     10/15/2021     10/11/2021     Lab Results   Component Value Date    CHOL 176 09/14/2011    TRIG 55 09/14/2011    HDL 56 (L) 09/14/2011    LDLDIRECT 122 (H) 09/14/2011     Lab Results   Component Value Date    ALT 22 07/22/2012    ALT 28 06/19/2012    AST 32 07/22/2012    AST 33 06/19/2012     BMP:    Lab Results   Component Value Date     10/15/2021     10/14/2021    K 3.7 10/15/2021    K 4.5 10/14/2021     10/15/2021     10/11/2021    CO2 25 10/15/2021    CO2 27 10/14/2021    BUN 8 10/15/2021    BUN 14 10/11/2021    CREATININE 0.8 10/15/2021    CREATININE 0.9 10/14/2021    CREATININE 0.9 10/11/2021 CMP:   Lab Results   Component Value Date     10/15/2021     10/14/2021    K 3.7 10/15/2021    K 4.5 10/14/2021     10/15/2021     10/11/2021    CO2 25 10/15/2021    CO2 27 10/14/2021    BUN 8 10/15/2021    BUN 14 10/11/2021    CREATININE 0.8 10/15/2021    CREATININE 0.9 10/14/2021    CREATININE 0.9 10/11/2021    PROT 7.1 07/22/2012    PROT 6.9 06/19/2012     Lab Results   Component Value Date    TSHHS 1.328 07/22/2012    TSHHS 0.913 06/19/2012       QUALITY MEASURES REVIEWED:  1.CAD:Patient is taking anti platelet agent:Yes  Patient does not have Hx of documented CAD  2. DYSLIPIDEMIA: Patient is on cholesterol lowering medication:No   Patient does not tolerate, secondary to side-effects. 3.Beta-Blocker therapy for CAD, if prior Myocardial Infarction:Yes   Due to side-effect(s) / Bradycardia  4. Counselled regarding smoking cessation. No   Patient does not Smoke. 5.Anticoagulation therapy (for A.Fib) No, CHADS score is 1  6. Discussed weight management strategies. Assessment & Plan:  Primary / Secondary prevention is the goal by aggressive risk modification, healthy and therapeutic life style changes for cardiovascular risk reduction. CORONARY ARTERY DISEASE:None known. Current chest pain which is atypical & getting better could be related to COVID. ETT 10/2017   Excellent exercise capacity. 11 METs work load.    Physiological BP response to exercise.    ETT non diagnostic as THR is not achieved even at high work load.     EKG: NSR, early repolarization.     HTN:Yes  well controlled on current medical regimen, see list above.   - changes in  treatment:   no   [facility]  [facility]:no              FP significant VHD noted  ECHO 10/2017   Normal left ventricle structure and function.   Ejection fraction is low normal, visually estimated at 50-55%.   No significant valvular disease noted.   No evidence of pericardial effusion.   Unremarkable echo.    Lena Nolasco 7/2021   There was no thrombus noted in the left atrial appendage.   Negative bubble study. No PFO or ASD noted.   No significant valvular disease noted.   Successful cardioversion using 200 joules    DYSLIPIDEMIA: none known    ARRHYTHMIAS:yes, CHADS is 1  Atrial fibrillation S/P Cardioversion again. He is rate controlled & on anticoagulation. CHADS score is only one. Stay on Rythmol. Patient to see Summer Porter for possible ablation because of recurrent episodes of PAF. TESTS ORDERED: Echo     PREVIOUSLY ORDERED TESTS REVIEWED & DISCUSSED WITH THE PATIENT:     I personally reviewed & interpreted, all previously ordered tests as copied above. Latest Labs are pulled in to the note with dates. Labs, specially in Reference to Lipid profile, Cardiac testing in the form of Echo ( dated: ), stress tests ( dated: ) & other relevant cardiac testing reviewed with patient & recommendations made based on assessment of the results. Discussed role of Cardiac risk factors & effects + treatment of co morbidities with patient & advised accordingly. MEDICATIONS: List of medications patient is currently taking is reviewed in detail with the patient & family member present. Discussed any side effects or problems taking the medication. Recommend Continue present management & medications as listed. AFFIRMATION: I  reviewed patient's history, previous & current medical problems & all Labs + testing. This includes chart prep even prior to the vosit. Various goals are discussed and multiple questions answered. Relevant concelling performed. Office follow up in six months.

## 2022-04-29 ENCOUNTER — PROCEDURE VISIT (OUTPATIENT)
Dept: CARDIOLOGY CLINIC | Age: 62
End: 2022-04-29
Payer: COMMERCIAL

## 2022-04-29 DIAGNOSIS — Z86.79 S/P ABLATION OF ATRIAL FIBRILLATION: ICD-10-CM

## 2022-04-29 DIAGNOSIS — I10 PRIMARY HYPERTENSION: ICD-10-CM

## 2022-04-29 DIAGNOSIS — Z98.890 S/P ABLATION OF ATRIAL FIBRILLATION: ICD-10-CM

## 2022-04-29 DIAGNOSIS — Z82.49 FAMILY HISTORY OF CORONARY ARTERY DISEASE: ICD-10-CM

## 2022-04-29 DIAGNOSIS — I48.0 PAROXYSMAL ATRIAL FIBRILLATION (HCC): ICD-10-CM

## 2022-04-29 LAB
LV EF: 58 %
LVEF MODALITY: NORMAL

## 2022-04-29 PROCEDURE — 93306 TTE W/DOPPLER COMPLETE: CPT | Performed by: INTERNAL MEDICINE

## 2022-05-02 ENCOUNTER — TELEPHONE (OUTPATIENT)
Dept: CARDIOLOGY CLINIC | Age: 62
End: 2022-05-02

## 2022-05-02 NOTE — TELEPHONE ENCOUNTER
Echo   Left ventricular function is normal, EF is estimated at 55-60%. Mild left ventricular hypertrophy. Grade I diastolic dysfunction. No regional wall motion abnormalities were detected. Mildly dilated left atrium. No significant valvular disease noted. Mild tricuspid regurgitation; RVSP is 22 mmHg.    No evidence of pericardial effusion      Patient verbally understood

## 2022-09-30 ENCOUNTER — OFFICE VISIT (OUTPATIENT)
Dept: CARDIOLOGY CLINIC | Age: 62
End: 2022-09-30
Payer: COMMERCIAL

## 2022-09-30 VITALS — SYSTOLIC BLOOD PRESSURE: 120 MMHG | DIASTOLIC BLOOD PRESSURE: 70 MMHG | HEART RATE: 58 BPM

## 2022-09-30 DIAGNOSIS — I10 PRIMARY HYPERTENSION: ICD-10-CM

## 2022-09-30 DIAGNOSIS — Z98.890 S/P ABLATION OF ATRIAL FIBRILLATION: ICD-10-CM

## 2022-09-30 DIAGNOSIS — I48.0 PAROXYSMAL ATRIAL FIBRILLATION (HCC): Primary | ICD-10-CM

## 2022-09-30 DIAGNOSIS — Z86.79 S/P ABLATION OF ATRIAL FIBRILLATION: ICD-10-CM

## 2022-09-30 PROCEDURE — 99213 OFFICE O/P EST LOW 20 MIN: CPT | Performed by: INTERNAL MEDICINE

## 2022-09-30 RX ORDER — TADALAFIL 5 MG/1
TABLET ORAL
COMMUNITY
Start: 2022-09-14

## 2022-09-30 NOTE — LETTER
[facility]  [address] [id number]  Phone: [phone]  Fax: [phone]    Morgan Valerio MD    September 30, 2022     Sue Marie MD  [address] [place] [address]    Patient: Satci Valdes   MR Number: [id number]   YOB: 1960   Date of Visit: 9/30/2022       Dear Sue Marie: Thank you for referring Staci Valdes to me for evaluation/treatment. Below are the relevant portions of my assessment and plan of care. If you have questions, please do not hesitate to call me. I look forward to following Redd Wray along with you.     Sincerely,      Morgan Valerio MD

## 2022-09-30 NOTE — PATIENT INSTRUCTIONS
CORONARY ARTERY DISEASE:None known. Current chest pain which is atypical & getting better could be related to COVID. ETT 10/2017   Excellent exercise capacity. 11 METs work load. Physiological BP response to exercise. ETT non diagnostic as THR is not achieved even at high work load. EKG: NSR, early repolarization. HTN:Yes  well controlled on current medical regimen, see list above. - changes in  treatment:   no   CARDIOMYOPATHY:No  CONGESTIVE HEART FAILURE:No    VHD:no              No significant VHD noted  ECHO 4/2022   Left ventricular function is normal, EF is estimated at 55-60%. Mild left ventricular hypertrophy. Grade I diastolic dysfunction. No regional wall motion abnormalities were detected. Mildly dilated left atrium. No significant valvular disease noted. Mild tricuspid regurgitation; RVSP is 22 mmHg. No evidence of pericardial effusion. TE-Echo 7/2021   There was no thrombus noted in the left atrial appendage. Negative bubble study. No PFO or ASD noted. No significant valvular disease noted. Successful cardioversion using 200 joules     DYSLIPIDEMIA: none known     ARRHYTHMIAS:yes, CHADS is 1  Atrial fibrillation S/P Ablation    TESTS ORDERED:none this visit     PREVIOUSLY ORDERED TESTS REVIEWED & DISCUSSED WITH THE PATIENT:     I personally reviewed & interpreted, all previously ordered tests as copied above. Latest Labs are pulled in to the note with dates. Labs, specially in Reference to Lipid profile, Cardiac testing in the form of Echo ( dated: ), stress tests ( dated: ) & other relevant cardiac testing reviewed with patient & recommendations made based on assessment of the results. Discussed role of Cardiac risk factors & effects + treatment of co morbidities with patient & advised accordingly. MEDICATIONS: List of medications patient is currently taking is reviewed in detail with the patient.  Discussed any side effects or problems taking the medication. Recommend Continue present management & medications as listed. AFFIRMATION: I spent at least 20 minutes of time reviewing patient's history, previous & current medical problems & all Labs + testing. This includes chart prep even prior to the vosit. Various goals are discussed and multiple questions answered. Relevant concelling performed. Office follow up in one year.

## 2022-09-30 NOTE — PROGRESS NOTES
OFFICE PROGRESS NOTES      [facility] LIZETTE is a 60 y.o. male who has    CHIEF COMPLAINT AS FOLLOWS:  CHEST PAIN: Patient denies any C/O chest pains at this time. SOB: C/O SOB with exertion. LEG EDEMA: No leg edema   PALPITATIONS: Denies any C/O Palpitations   DIZZINESS: No C/O Dizziness   SYNCOPE: None   OTHER/ ADDITIONAL COMPLAINTS:                                     HPI: Patient is here for F/U on his  Arrhythmia, HTN & Dyslipidemia problems. Arrhythmia: Patient has H/O  PAF, S/P Ablation. HTN: Patient has known essential HTN. Has been treated with guideline recommended medical / physical/ diet therapy as stated below. Dyslipidemia: Patient has known mixed dyslipidemia. Has been treated with guideline recommended medical / physical/ diet therapy as stated below. Current Outpatient Medications   Medication Sig Dispense Refill    tadalafil (CIALIS) 5 MG tablet TAKE 1 TABLET BY MOUTH EVERY DAY      aspirin EC 81 MG EC tablet Take 1 tablet by mouth daily 90 tablet 1    propranolol (INDERAL LA) 60 MG extended release capsule Take 1 capsule by mouth daily 90 capsule 3    lisinopril (PRINIVIL;ZESTRIL) 5 MG tablet Take 1 tablet by mouth daily. 30 tablet 6    citalopram (CELEXA) 20 MG tablet Take 40 mg by mouth daily       Multiple Vitamin (MULTIVITAMIN PO) Take 1 tablet by mouth daily. Omega-3 Fatty Acids (FISH OIL PO) Take by mouth (Patient not taking: Reported on 9/30/2022)       No current facility-administered medications for this visit. Allergies: Patient has no known allergies.   Review of Systems:    Constitutional: Negative for diaphoresis and fatigue  Respiratory: Negative for shortness of breath  Cardiovascular: Negative for chest pain, dyspnea on exertion, claudication, edema, irregular heartbeat, murmur, palpitations or shortness of breath  Musculoskeletal: Negative for muscle pain, muscular weakness, negative for pain in arm and leg or swelling in foot and leg    Objective:  /70   Pulse 58   Wt Readings from Last 3 Encounters:   04/14/22 235 lb (106.6 kg)   02/16/22 237 lb 9.6 oz (107.8 kg)   11/19/21 230 lb 12.8 oz (104.7 kg)     There is no height or weight on file to calculate BMI. GENERAL - Alert, oriented, pleasant, in no apparent distress. EYES: No jaundice, no conjunctival pallor. Neck - Supple. No jugular venous distention noted. No carotid bruits. Cardiovascular - Normal S1 and S2 without obvious murmur or gallop. Extremities - No cyanosis, clubbing, or significant edema. Pulmonary - No respiratory distress. No wheezes or rales.       MEDICAL DECISION MAKING & DATA REVIEW:    Lab Review   No results found for: TROPONINT  Lab Results   Component Value Date/Time    BNP 19 07/22/2012 02:05 PM     Lab Results   Component Value Date    INR 0.95 10/11/2021    INR 1.10 03/01/2021     No results found for: LABA1C  Lab Results   Component Value Date    WBC 11.9 (H) 10/15/2021    WBC 6.9 10/11/2021    HCT 41.3 (L) 10/15/2021    HCT 44.0 10/14/2021    MCV 95.8 10/15/2021    MCV 94.8 10/11/2021     10/15/2021     10/11/2021     Lab Results   Component Value Date    CHOL 176 09/14/2011    TRIG 55 09/14/2011    HDL 56 (L) 09/14/2011    LDLDIRECT 122 (H) 09/14/2011     Lab Results   Component Value Date    ALT 22 07/22/2012    ALT 28 06/19/2012    AST 32 07/22/2012    AST 33 06/19/2012     BMP:    Lab Results   Component Value Date/Time     10/15/2021 07:31 AM     10/14/2021 08:35 AM    K 3.7 10/15/2021 07:31 AM    K 4.5 10/14/2021 08:35 AM     10/15/2021 07:31 AM     10/11/2021 02:03 PM    CO2 25 10/15/2021 07:31 AM    CO2 27 10/14/2021 08:35 AM    BUN 8 10/15/2021 07:31 AM    BUN 14 10/11/2021 02:03 PM    CREATININE 0.8 10/15/2021 07:31 AM    CREATININE 0.9 10/14/2021 08:35 AM    CREATININE 0.9 10/11/2021 02:03 PM     CMP:   Lab Results   Component Value Date/Time     10/15/2021 07:31 AM     10/14/2021 08:35 AM    K 3.7 10/15/2021 07:31 AM    K 4.5 10/14/2021 08:35 AM     10/15/2021 07:31 AM     10/11/2021 02:03 PM    CO2 25 10/15/2021 07:31 AM    CO2 27 10/14/2021 08:35 AM    BUN 8 10/15/2021 07:31 AM    BUN 14 10/11/2021 02:03 PM    CREATININE 0.8 10/15/2021 07:31 AM    CREATININE 0.9 10/14/2021 08:35 AM    CREATININE 0.9 10/11/2021 02:03 PM    PROT 7.1 07/22/2012 02:05 PM    PROT 6.9 06/19/2012 03:16 PM     Lab Results   Component Value Date/Time    TSHHS 1.328 07/22/2012 02:05 PM    TSHHS 0.913 06/19/2012 03:16 PM       QUALITY MEASURES REVIEWED:  1.CAD:Patient is taking anti platelet agent:Yes  Patient does not have Hx of documented CAD  2. DYSLIPIDEMIA: Patient is on cholesterol lowering medication:No   3. Beta-Blocker therapy for CAD, if prior Myocardial Infarction:Yes   4. Counselled regarding smoking cessation. No   Patient does not Smoke. 5.Anticoagulation therapy (for A.Fib) No   Does Not have A.Fib.  6.Discussed weight management strategies. Assessment & Plan:  Primary / Secondary prevention is the goal by aggressive risk modification, healthy and therapeutic life style changes for cardiovascular risk reduction. CORONARY ARTERY DISEASE:None known. Current chest pain which is atypical & getting better could be related to COVID. ETT 10/2017   Excellent exercise capacity. 11 METs work load. Physiological BP response to exercise. ETT non diagnostic as THR is not achieved even at high work load. EKG: NSR, early repolarization. HTN:Yes  well controlled on current medical regimen, see list above. - changes in  treatment:   no   CARDIOMYOPATHY:No  CONGESTIVE HEART FAILURE:No    VHD:no              No significant VHD noted  ECHO 4/2022   Left ventricular function is normal, EF is estimated at 55-60%. Mild left ventricular hypertrophy. Grade I diastolic dysfunction. No regional wall motion abnormalities were detected. Mildly dilated left atrium.    No significant valvular disease noted. Mild tricuspid regurgitation; RVSP is 22 mmHg. No evidence of pericardial effusion. TE-Echo 7/2021   There was no thrombus noted in the left atrial appendage. Negative bubble study. No PFO or ASD noted. No significant valvular disease noted. Successful cardioversion using 200 joules     DYSLIPIDEMIA: none known     ARRHYTHMIAS:yes, CHADS is 1  Atrial fibrillation S/P Ablation    TESTS ORDERED:none this visit     PREVIOUSLY ORDERED TESTS REVIEWED & DISCUSSED WITH THE PATIENT:     I personally reviewed & interpreted, all previously ordered tests as copied above. Latest Labs are pulled in to the note with dates. Labs, specially in Reference to Lipid profile, Cardiac testing in the form of Echo ( dated: ), stress tests ( dated: ) & other relevant cardiac testing reviewed with patient & recommendations made based on assessment of the results. Discussed role of Cardiac risk factors & effects + treatment of co morbidities with patient & advised accordingly. MEDICATIONS: List of medications patient is currently taking is reviewed in detail with the patient. Discussed any side effects or problems taking the medication. Recommend Continue present management & medications as listed. AFFIRMATION: I spent at least 20 minutes of time reviewing patient's history, previous & current medical problems & all Labs + testing. This includes chart prep even prior to the vosit. Various goals are discussed and multiple questions answered. Relevant concelling performed. Office follow up in one year.

## 2022-11-15 ENCOUNTER — OFFICE VISIT (OUTPATIENT)
Dept: CARDIOLOGY CLINIC | Age: 62
End: 2022-11-15
Payer: COMMERCIAL

## 2022-11-15 ENCOUNTER — TELEPHONE (OUTPATIENT)
Dept: CARDIOLOGY CLINIC | Age: 62
End: 2022-11-15

## 2022-11-15 VITALS
BODY MASS INDEX: 30.34 KG/M2 | DIASTOLIC BLOOD PRESSURE: 60 MMHG | WEIGHT: 244 LBS | SYSTOLIC BLOOD PRESSURE: 100 MMHG | HEIGHT: 75 IN | HEART RATE: 115 BPM

## 2022-11-15 DIAGNOSIS — I48.0 PAROXYSMAL ATRIAL FIBRILLATION (HCC): Primary | ICD-10-CM

## 2022-11-15 DIAGNOSIS — Z86.79 S/P ABLATION OF ATRIAL FIBRILLATION: ICD-10-CM

## 2022-11-15 DIAGNOSIS — I10 PRIMARY HYPERTENSION: ICD-10-CM

## 2022-11-15 DIAGNOSIS — Z01.810 PRE-OPERATIVE CARDIOVASCULAR EXAMINATION: Primary | ICD-10-CM

## 2022-11-15 DIAGNOSIS — Z98.890 S/P ABLATION OF ATRIAL FIBRILLATION: ICD-10-CM

## 2022-11-15 PROCEDURE — 93000 ELECTROCARDIOGRAM COMPLETE: CPT | Performed by: INTERNAL MEDICINE

## 2022-11-15 PROCEDURE — 3078F DIAST BP <80 MM HG: CPT | Performed by: INTERNAL MEDICINE

## 2022-11-15 PROCEDURE — 99214 OFFICE O/P EST MOD 30 MIN: CPT | Performed by: INTERNAL MEDICINE

## 2022-11-15 PROCEDURE — 3074F SYST BP LT 130 MM HG: CPT | Performed by: INTERNAL MEDICINE

## 2022-11-15 RX ORDER — GUAIFENESIN 600 MG/1
1200 TABLET, EXTENDED RELEASE ORAL 2 TIMES DAILY
COMMUNITY

## 2022-11-15 RX ORDER — OMEPRAZOLE 20 MG/1
20 CAPSULE, DELAYED RELEASE ORAL DAILY
COMMUNITY

## 2022-11-15 NOTE — PATIENT INSTRUCTIONS
CORONARY ARTERY DISEASE:None known. Current chest pain which is atypical & getting better could be related to COVID. ETT 10/2017   Excellent exercise capacity. 11 METs work load. Physiological BP response to exercise. ETT non diagnostic as THR is not achieved even at high work load. EKG: NSR, early repolarization. HTN:Yes  well controlled on current medical regimen, see list above. - changes in  treatment:   no   CARDIOMYOPATHY:No  CONGESTIVE HEART FAILURE:No    VHD:no              No significant VHD noted  ECHO 4/2022   Left ventricular function is normal, EF is estimated at 55-60%. Mild left ventricular hypertrophy. Grade I diastolic dysfunction. No regional wall motion abnormalities were detected. Mildly dilated left atrium. No significant valvular disease noted. Mild tricuspid regurgitation; RVSP is 22 mmHg. No evidence of pericardial effusion. TE-Echo 7/2021   There was no thrombus noted in the left atrial appendage. Negative bubble study. No PFO or ASD noted. No significant valvular disease noted. Successful cardioversion using 200 joules     DYSLIPIDEMIA: none known     ARRHYTHMIAS:yes, CHADS is 1  Atrial fibrillation S/P Ablation. Patient is back in A-fib, after a bout of cold. EKG: A-fib 115/min. Will re-start Eliquis 5 mg BID, arrange D/C Cardioversion & put on Multaq. TESTS ORDERED: D/C Cardioversion     PREVIOUSLY ORDERED TESTS REVIEWED & DISCUSSED WITH THE PATIENT:     I personally reviewed & interpreted, all previously ordered tests as copied above. Latest Labs are pulled in to the note with dates. Labs, specially in Reference to Lipid profile, Cardiac testing in the form of Echo ( dated: ), stress tests ( dated: ) & other relevant cardiac testing reviewed with patient & recommendations made based on assessment of the results. Discussed role of Cardiac risk factors & effects + treatment of co morbidities with patient & advised accordingly. MEDICATIONS: List of medications patient is currently taking is reviewed in detail with the patient & family member present. Discussed any side effects or problems taking the medication. Recommend: Patient to re-start  Eliquis. AFFIRMATION: I  reviewed patient's history, previous & current medical problems & all Labs + testing. This includes chart prep even prior to the vosit. Various goals are discussed and multiple questions answered. Relevant concelling performed. Office follow up in a month after the procedure.

## 2022-11-15 NOTE — LETTER
[facility]  [address] [address]  Phone: [phone]  Fax: [phone]    James Salcido MD    November 15, 2022     Deonna Neff DO, DO [id number] [facility]    Patient: Brittany Dodd   MR Number: [id number]   YOB: 1960   Date of Visit: 11/15/2022       Dear Deonna Neff DO:    Thank you for referring Brittany Dodd to me for evaluation/treatment. Below are the relevant portions of my assessment and plan of care. If you have questions, please do not hesitate to call me. I look forward to following Ovidio Perez along with you.     Sincerely,      James Salcido MD

## 2022-11-15 NOTE — PROGRESS NOTES
OFFICE PROGRESS NOTES      Kellen Salgado is a 60 y.o. male who has    CHIEF COMPLAINT AS FOLLOWS:  CHEST PAIN: Patient denies any C/O chest pains at this time. SOB: C/O SOB with exertion. LEG EDEMA: No leg edema   PALPITATIONS:  C/O Palpitations, thinks he went back into A-fib last night. DIZZINESS: No C/O Dizziness   SYNCOPE: None   OTHER/ ADDITIONAL COMPLAINTS:                                     HPI: Patient is here for F/U on his Arrhythmia, HTN & Dyslipidemia problems. Arrhythmia: Patient has known  PAF S/P Ablation. HTN: Patient has known essential HTN. Has been treated with guideline recommended medical / physical/ diet therapy as stated below. Dyslipidemia: Patient has known mixed dyslipidemia. Has been treated with guideline recommended medical / physical/ diet therapy as stated below. Current Outpatient Medications   Medication Sig Dispense Refill    omeprazole (PRILOSEC) 20 MG delayed release capsule Take 20 mg by mouth daily      guaiFENesin (MUCINEX) 600 MG extended release tablet Take 1,200 mg by mouth 2 times daily      tadalafil (CIALIS) 5 MG tablet TAKE 1 TABLET BY MOUTH EVERY DAY      aspirin EC 81 MG EC tablet Take 1 tablet by mouth daily 90 tablet 1    propranolol (INDERAL LA) 60 MG extended release capsule Take 1 capsule by mouth daily 90 capsule 3    lisinopril (PRINIVIL;ZESTRIL) 5 MG tablet Take 1 tablet by mouth daily. 30 tablet 6    citalopram (CELEXA) 20 MG tablet Take 40 mg by mouth daily       Multiple Vitamin (MULTIVITAMIN PO) Take 1 tablet by mouth daily. Omega-3 Fatty Acids (FISH OIL PO) Take by mouth (Patient not taking: Reported on 11/15/2022)       No current facility-administered medications for this visit. Allergies: Patient has no known allergies.   Review of Systems:    Constitutional: Negative for diaphoresis and fatigue  Respiratory: Negative for shortness of breath  Cardiovascular: Negative for chest pain, dyspnea on exertion, claudication, edema, irregular heartbeat, murmur, palpitations or shortness of breath  Musculoskeletal: Negative for muscle pain, muscular weakness, negative for pain in arm and leg or swelling in foot and leg    Objective:  /60 (Site: Left Upper Arm, Position: Sitting, Cuff Size: Medium Adult)   Pulse (!) 115   Ht 6' 3\" (1.905 m)   Wt 244 lb (110.7 kg)   BMI 30.50 kg/m²   Wt Readings from Last 3 Encounters:   11/15/22 244 lb (110.7 kg)   04/14/22 235 lb (106.6 kg)   02/16/22 237 lb 9.6 oz (107.8 kg)     Body mass index is 30.5 kg/m². GENERAL - Alert, oriented, pleasant, in no apparent distress. EYES: No jaundice, no conjunctival pallor. Neck - Supple. No jugular venous distention noted. No carotid bruits. Cardiovascular - Normal S1 and S2 without obvious murmur or gallop. Extremities - No cyanosis, clubbing, or significant edema. Pulmonary - No respiratory distress. No wheezes or rales.       MEDICAL DECISION MAKING & DATA REVIEW:    Lab Review   No results found for: TROPONINT  Lab Results   Component Value Date/Time    BNP 19 07/22/2012 02:05 PM     Lab Results   Component Value Date    INR 0.95 10/11/2021    INR 1.10 03/01/2021     No results found for: LABA1C  Lab Results   Component Value Date    WBC 11.9 (H) 10/15/2021    WBC 6.9 10/11/2021    HCT 41.3 (L) 10/15/2021    HCT 44.0 10/14/2021    MCV 95.8 10/15/2021    MCV 94.8 10/11/2021     10/15/2021     10/11/2021     Lab Results   Component Value Date    CHOL 176 09/14/2011    TRIG 55 09/14/2011    HDL 56 (L) 09/14/2011    LDLDIRECT 122 (H) 09/14/2011     Lab Results   Component Value Date    ALT 22 07/22/2012    ALT 28 06/19/2012    AST 32 07/22/2012    AST 33 06/19/2012     BMP:    Lab Results   Component Value Date/Time     10/15/2021 07:31 AM     10/14/2021 08:35 AM    K 3.7 10/15/2021 07:31 AM    K 4.5 10/14/2021 08:35 AM     10/15/2021 07:31 AM     10/11/2021 02:03 PM    CO2 25 10/15/2021 07:31 AM    CO2 27 10/14/2021 08:35 AM    BUN 8 10/15/2021 07:31 AM    BUN 14 10/11/2021 02:03 PM    CREATININE 0.8 10/15/2021 07:31 AM    CREATININE 0.9 10/14/2021 08:35 AM    CREATININE 0.9 10/11/2021 02:03 PM     CMP:   Lab Results   Component Value Date/Time     10/15/2021 07:31 AM     10/14/2021 08:35 AM    K 3.7 10/15/2021 07:31 AM    K 4.5 10/14/2021 08:35 AM     10/15/2021 07:31 AM     10/11/2021 02:03 PM    CO2 25 10/15/2021 07:31 AM    CO2 27 10/14/2021 08:35 AM    BUN 8 10/15/2021 07:31 AM    BUN 14 10/11/2021 02:03 PM    CREATININE 0.8 10/15/2021 07:31 AM    CREATININE 0.9 10/14/2021 08:35 AM    CREATININE 0.9 10/11/2021 02:03 PM    PROT 7.1 07/22/2012 02:05 PM    PROT 6.9 06/19/2012 03:16 PM     Lab Results   Component Value Date/Time    TSHHS 1.328 07/22/2012 02:05 PM    TSHHS 0.913 06/19/2012 03:16 PM       QUALITY MEASURES REVIEWED:  1.CAD:Patient is taking anti platelet agent:Yes  Patient does not have Hx of documented CAD  2. DYSLIPIDEMIA: Patient is on cholesterol lowering medication:No   3. Beta-Blocker therapy for CAD, if prior Myocardial Infarction:Yes   4. Counselled regarding smoking cessation. No   Patient does not Smoke. 5.Anticoagulation therapy (for A.Fib) No, CHADS is 1   6. Discussed weight management strategies. Assessment & Plan:  Primary / Secondary prevention is the goal by aggressive risk modification, healthy and therapeutic life style changes for cardiovascular risk reduction. CORONARY ARTERY DISEASE:None known. Current chest pain which is atypical & getting better could be related to COVID. ETT 10/2017   Excellent exercise capacity. 11 METs work load. Physiological BP response to exercise. ETT non diagnostic as THR is not achieved even at high work load. EKG: NSR, early repolarization. HTN:Yes  well controlled on current medical regimen, see list above.   - changes in  treatment:   no   CARDIOMYOPATHY:No  CONGESTIVE HEART FAILURE:No    VHD:no              No significant VHD noted  ECHO 4/2022   Left ventricular function is normal, EF is estimated at 55-60%. Mild left ventricular hypertrophy. Grade I diastolic dysfunction. No regional wall motion abnormalities were detected. Mildly dilated left atrium. No significant valvular disease noted. Mild tricuspid regurgitation; RVSP is 22 mmHg. No evidence of pericardial effusion. TE-Echo 7/2021   There was no thrombus noted in the left atrial appendage. Negative bubble study. No PFO or ASD noted. No significant valvular disease noted. Successful cardioversion using 200 joules     DYSLIPIDEMIA: none known     ARRHYTHMIAS:yes, CHADS is 1  Atrial fibrillation S/P Ablation. Patient is back in A-fib, after a bout of cold. EKG: A-fib 115/min. Will re-start Eliquis 5 mg BID, arrange D/C Cardioversion & put on Multaq. TESTS ORDERED: D/C Cardioversion     PREVIOUSLY ORDERED TESTS REVIEWED & DISCUSSED WITH THE PATIENT:     I personally reviewed & interpreted, all previously ordered tests as copied above. Latest Labs are pulled in to the note with dates. Labs, specially in Reference to Lipid profile, Cardiac testing in the form of Echo ( dated: ), stress tests ( dated: ) & other relevant cardiac testing reviewed with patient & recommendations made based on assessment of the results. Discussed role of Cardiac risk factors & effects + treatment of co morbidities with patient & advised accordingly. MEDICATIONS: List of medications patient is currently taking is reviewed in detail with the patient & family member present. Discussed any side effects or problems taking the medication. Recommend: Patient to re-start  Eliquis. AFFIRMATION: I  reviewed patient's history, previous & current medical problems & all Labs + testing. This includes chart prep even prior to the vosit. Various goals are discussed and multiple questions answered. Relevant concelling performed. Office follow up in a month after the procedure.

## 2022-11-15 NOTE — PROGRESS NOTES
Atrial Fibrillation CHADSVASC2 Score Stroke Risk:   60 y.o. <65 - 0    male Male - 0   CHF HX: No - 0   HTN HX: Yes - 1   Stroke/TIA/Thromboembolism No - 0   Vascular Disease HX: No - 0   Diabetes Mellitus Yes - 1   CHADSVASC 2 Score 2      Annual Stroke Risk 2.2%- moderate-high

## 2022-11-16 ENCOUNTER — NURSE ONLY (OUTPATIENT)
Dept: CARDIOLOGY CLINIC | Age: 62
End: 2022-11-16
Payer: COMMERCIAL

## 2022-11-16 ENCOUNTER — HOSPITAL ENCOUNTER (OUTPATIENT)
Age: 62
Discharge: HOME | End: 2022-11-16
Payer: COMMERCIAL

## 2022-11-16 ENCOUNTER — HOSPITAL ENCOUNTER (OUTPATIENT)
Age: 62
Setting detail: SPECIMEN
Discharge: HOME | End: 2022-11-16
Payer: COMMERCIAL

## 2022-11-16 VITALS — TEMPERATURE: 97.5 F | DIASTOLIC BLOOD PRESSURE: 60 MMHG | SYSTOLIC BLOOD PRESSURE: 100 MMHG

## 2022-11-16 DIAGNOSIS — Z01.810 PRE-OPERATIVE CARDIOVASCULAR EXAMINATION: Primary | ICD-10-CM

## 2022-11-16 LAB
ANION GAP SERPL CALCULATED.3IONS-SCNC: 10 MMOL/L (ref 4–16)
BUN BLDV-MCNC: 17 MG/DL (ref 6–23)
CALCIUM SERPL-MCNC: 9.8 MG/DL (ref 8.3–10.6)
CHLORIDE BLD-SCNC: 99 MMOL/L (ref 99–110)
CO2: 25 MMOL/L (ref 21–32)
CREAT SERPL-MCNC: 0.9 MG/DL (ref 0.9–1.3)
GFR SERPL CREATININE-BSD FRML MDRD: >60 ML/MIN/1.73M2
GLUCOSE BLD-MCNC: 101 MG/DL (ref 70–99)
POTASSIUM SERPL-SCNC: 4.7 MMOL/L (ref 3.5–5.1)
SODIUM BLD-SCNC: 134 MMOL/L (ref 135–145)

## 2022-11-16 PROCEDURE — U0003 INFECTIOUS AGENT DETECTION BY NUCLEIC ACID (DNA OR RNA); SEVERE ACUTE RESPIRATORY SYNDROME CORONAVIRUS 2 (SARS-COV-2) (CORONAVIRUS DISEASE [COVID-19]), AMPLIFIED PROBE TECHNIQUE, MAKING USE OF HIGH THROUGHPUT TECHNOLOGIES AS DESCRIBED BY CMS-2020-01-R: HCPCS

## 2022-11-16 PROCEDURE — U0005 INFEC AGEN DETEC AMPLI PROBE: HCPCS

## 2022-11-16 PROCEDURE — 36415 COLL VENOUS BLD VENIPUNCTURE: CPT

## 2022-11-16 PROCEDURE — 99211 OFF/OP EST MAY X REQ PHY/QHP: CPT | Performed by: INTERNAL MEDICINE

## 2022-11-16 PROCEDURE — 80048 BASIC METABOLIC PNL TOTAL CA: CPT

## 2022-11-16 NOTE — PROGRESS NOTES
Patient was here in office & educated on ERIC/CV for Dx: AFIB. Procedure is scheduled for 11/21/22 @ 7 AM, w/arrival @ 6 AM, @ [facility]. Pre-admission orders were given to patient for labs & CXR, which are due 11/16/22 @ [facility]. Procedure and risks were explained to patient. Consent forms were signed. Instructions were given to patient to remain NPO after midnight the night before procedure. Patient may take morning meds the morning of procedure with small amount of water. Patient is asked to call hospital @ [phone], 1 to 2 days before procedure to pre-register. Patient was notified that procedure could be delayed due to an emergency. Patient voiced understanding. Copies of consent, pre-testing orders, & instructions scanned into media        Patient informed of instructions and guidance for performing test.  Throat swab performed. Swab placed into labeled collection tube. Collection tube and lab order placed in plastic bag. Bag placed in biohazard bag and placed in fridge.  called for . Patient instructed to self quarantine until procedure.

## 2022-11-16 NOTE — PROGRESS NOTES
OFFICE PROGRESS NOTES      Kellen Salgado is a 60 y.o. male who has    CHIEF COMPLAINT AS FOLLOWS:  CHEST PAIN: Patient denies any C/O chest pains at this time. SOB: C/O SOB with exertion. LEG EDEMA: No leg edema   PALPITATIONS:  C/O Palpitations, thinks he went back into A-fib last night. DIZZINESS: No C/O Dizziness   SYNCOPE: None   OTHER/ ADDITIONAL COMPLAINTS:                                     HPI: Patient is here for F/U on his Arrhythmia, HTN & Dyslipidemia problems. Arrhythmia: Patient has known  PAF S/P Ablation. HTN: Patient has known essential HTN. Has been treated with guideline recommended medical / physical/ diet therapy as stated below. Dyslipidemia: Patient has known mixed dyslipidemia. Has been treated with guideline recommended medical / physical/ diet therapy as stated below. Current Outpatient Medications   Medication Sig Dispense Refill    omeprazole (PRILOSEC) 20 MG delayed release capsule Take 20 mg by mouth daily      guaiFENesin (MUCINEX) 600 MG extended release tablet Take 1,200 mg by mouth 2 times daily      apixaban (ELIQUIS) 5 MG TABS tablet Take 1 tablet by mouth 2 times daily 180 tablet 1    tadalafil (CIALIS) 5 MG tablet TAKE 1 TABLET BY MOUTH EVERY DAY      aspirin EC 81 MG EC tablet Take 1 tablet by mouth daily 90 tablet 1    propranolol (INDERAL LA) 60 MG extended release capsule Take 1 capsule by mouth daily 90 capsule 3    lisinopril (PRINIVIL;ZESTRIL) 5 MG tablet Take 1 tablet by mouth daily. 30 tablet 6    citalopram (CELEXA) 20 MG tablet Take 40 mg by mouth daily       Multiple Vitamin (MULTIVITAMIN PO) Take 1 tablet by mouth daily. Omega-3 Fatty Acids (FISH OIL PO) Take by mouth (Patient not taking: Reported on 11/15/2022)      apixaban (ELIQUIS) 5 MG TABS tablet Take 5 mg by mouth 2 times daily       No current facility-administered medications for this visit. Allergies: Patient has no known allergies.   Review of Systems: Constitutional: Negative for diaphoresis and fatigue  Respiratory: Negative for shortness of breath  Cardiovascular: Negative for chest pain, dyspnea on exertion, claudication, edema, irregular heartbeat, murmur, palpitations or shortness of breath  Musculoskeletal: Negative for muscle pain, muscular weakness, negative for pain in arm and leg or swelling in foot and leg    Objective:  /60 (Site: Left Upper Arm, Position: Sitting, Cuff Size: Medium Adult)   Pulse (!) 115   Ht 6' 3\" (1.905 m)   Wt 244 lb (110.7 kg)   BMI 30.50 kg/m²   Wt Readings from Last 3 Encounters:   11/15/22 244 lb (110.7 kg)   04/14/22 235 lb (106.6 kg)   02/16/22 237 lb 9.6 oz (107.8 kg)     Body mass index is 30.5 kg/m². GENERAL - Alert, oriented, pleasant, in no apparent distress. EYES: No jaundice, no conjunctival pallor. Neck - Supple. No jugular venous distention noted. No carotid bruits. Cardiovascular - Normal S1 and S2 without obvious murmur or gallop. Extremities - No cyanosis, clubbing, or significant edema. Pulmonary - No respiratory distress. No wheezes or rales.       MEDICAL DECISION MAKING & DATA REVIEW:    Lab Review   No results found for: TROPONINT  Lab Results   Component Value Date/Time    BNP 19 07/22/2012 02:05 PM     Lab Results   Component Value Date    INR 0.95 10/11/2021    INR 1.10 03/01/2021     No results found for: LABA1C  Lab Results   Component Value Date    WBC 11.9 (H) 10/15/2021    WBC 6.9 10/11/2021    HCT 41.3 (L) 10/15/2021    HCT 44.0 10/14/2021    MCV 95.8 10/15/2021    MCV 94.8 10/11/2021     10/15/2021     10/11/2021     Lab Results   Component Value Date    CHOL 176 09/14/2011    TRIG 55 09/14/2011    HDL 56 (L) 09/14/2011    LDLDIRECT 122 (H) 09/14/2011     Lab Results   Component Value Date    ALT 22 07/22/2012    ALT 28 06/19/2012    AST 32 07/22/2012    AST 33 06/19/2012     BMP:    Lab Results   Component Value Date/Time     10/15/2021 07:31 AM    NA 140 10/14/2021 08:35 AM    K 3.7 10/15/2021 07:31 AM    K 4.5 10/14/2021 08:35 AM     10/15/2021 07:31 AM     10/11/2021 02:03 PM    CO2 25 10/15/2021 07:31 AM    CO2 27 10/14/2021 08:35 AM    BUN 8 10/15/2021 07:31 AM    BUN 14 10/11/2021 02:03 PM    CREATININE 0.8 10/15/2021 07:31 AM    CREATININE 0.9 10/14/2021 08:35 AM    CREATININE 0.9 10/11/2021 02:03 PM     CMP:   Lab Results   Component Value Date/Time     10/15/2021 07:31 AM     10/14/2021 08:35 AM    K 3.7 10/15/2021 07:31 AM    K 4.5 10/14/2021 08:35 AM     10/15/2021 07:31 AM     10/11/2021 02:03 PM    CO2 25 10/15/2021 07:31 AM    CO2 27 10/14/2021 08:35 AM    BUN 8 10/15/2021 07:31 AM    BUN 14 10/11/2021 02:03 PM    CREATININE 0.8 10/15/2021 07:31 AM    CREATININE 0.9 10/14/2021 08:35 AM    CREATININE 0.9 10/11/2021 02:03 PM    PROT 7.1 07/22/2012 02:05 PM    PROT 6.9 06/19/2012 03:16 PM     Lab Results   Component Value Date/Time    TSHHS 1.328 07/22/2012 02:05 PM    TSHHS 0.913 06/19/2012 03:16 PM       QUALITY MEASURES REVIEWED:  1.CAD:Patient is taking anti platelet agent:Yes  Patient does not have Hx of documented CAD  2. DYSLIPIDEMIA: Patient is on cholesterol lowering medication:No   3. Beta-Blocker therapy for CAD, if prior Myocardial Infarction:Yes   4. Counselled regarding smoking cessation. No   Patient does not Smoke. 5.Anticoagulation therapy (for A.Fib) No, CHADS is 1   6. Discussed weight management strategies. Assessment & Plan:  Primary / Secondary prevention is the goal by aggressive risk modification, healthy and therapeutic life style changes for cardiovascular risk reduction. CORONARY ARTERY DISEASE:None known. Current chest pain which is atypical & getting better could be related to COVID. ETT 10/2017   Excellent exercise capacity. 11 METs work load. Physiological BP response to exercise. ETT non diagnostic as THR is not achieved even at high work load.       EKG: NSR, early repolarization. HTN:Yes  well controlled on current medical regimen, see list above. - changes in  treatment:   no   CARDIOMYOPATHY:No  CONGESTIVE HEART FAILURE:No    VHD:no              No significant VHD noted  ECHO 4/2022   Left ventricular function is normal, EF is estimated at 55-60%. Mild left ventricular hypertrophy. Grade I diastolic dysfunction. No regional wall motion abnormalities were detected. Mildly dilated left atrium. No significant valvular disease noted. Mild tricuspid regurgitation; RVSP is 22 mmHg. No evidence of pericardial effusion. TE-Echo 7/2021   There was no thrombus noted in the left atrial appendage. Negative bubble study. No PFO or ASD noted. No significant valvular disease noted. Successful cardioversion using 200 joules     DYSLIPIDEMIA: none known     ARRHYTHMIAS:yes, CHADS is 1  Atrial fibrillation S/P Ablation. Patient is back in A-fib, after a bout of cold. EKG: A-fib 115/min. Will re-start Eliquis 5 mg BID, arrange D/C Cardioversion & put on Multaq. TESTS ORDERED: D/C Cardioversion     PREVIOUSLY ORDERED TESTS REVIEWED & DISCUSSED WITH THE PATIENT:     I personally reviewed & interpreted, all previously ordered tests as copied above. Latest Labs are pulled in to the note with dates. Labs, specially in Reference to Lipid profile, Cardiac testing in the form of Echo ( dated: ), stress tests ( dated: ) & other relevant cardiac testing reviewed with patient & recommendations made based on assessment of the results. Discussed role of Cardiac risk factors & effects + treatment of co morbidities with patient & advised accordingly. MEDICATIONS: List of medications patient is currently taking is reviewed in detail with the patient & family member present. Discussed any side effects or problems taking the medication. Recommend: Patient to re-start  Eliquis.      AFFIRMATION: I  reviewed patient's history, previous & current medical problems & all Labs + testing. This includes chart prep even prior to the vosit. Various goals are discussed and multiple questions answered. Relevant concelling performed. Office follow up in a month after the procedure.

## 2022-11-17 LAB
SARS-COV-2: NOT DETECTED
SOURCE: NORMAL

## 2022-11-18 ENCOUNTER — TELEPHONE (OUTPATIENT)
Dept: CARDIOLOGY CLINIC | Age: 62
End: 2022-11-18

## 2022-11-18 NOTE — TELEPHONE ENCOUNTER
Patient called to say he has converted to a sinus rhythm. Per Dr. Malone Shall an EKG is needed to verify. Patient e-mailed EKG. Dr. Malone Shall verified that patient is not longer in A-Fib.   Patient to return in one month for OV and EKG

## 2022-11-22 DIAGNOSIS — I49.9 IRREGULAR HEART BEAT: Primary | ICD-10-CM

## 2022-11-28 ENCOUNTER — NURSE ONLY (OUTPATIENT)
Dept: CARDIOLOGY CLINIC | Age: 62
End: 2022-11-28

## 2022-11-28 DIAGNOSIS — R00.2 PALPITATION: Primary | ICD-10-CM

## 2022-11-28 NOTE — PROGRESS NOTES
72 hour holter monitor applied 11/28/2022 @4:05 for Palpitations Serial # [id number] . Instructed patient on monitor and proper use. Instructed on diary. When to remove and bring it back. Must leave the holter monitor on  without removing for the duration of time ordered. Answered all questions the patient had. Instructed patient to call [facility] at [phone] with any questions or concerns with the monitor.

## 2022-12-09 ENCOUNTER — TELEPHONE (OUTPATIENT)
Dept: CARDIOLOGY CLINIC | Age: 62
End: 2022-12-09

## 2022-12-09 NOTE — TELEPHONE ENCOUNTER
Patient advised that final report not yet in chart and Dr Leopoldo Mallory gone for the day. Would get Monday morning and call back.

## 2022-12-12 ENCOUNTER — TELEPHONE (OUTPATIENT)
Dept: CARDIOLOGY CLINIC | Age: 62
End: 2022-12-12

## 2022-12-12 NOTE — TELEPHONE ENCOUNTER
Patient given monitor results. Baseline rhythm is normal sinus. Episodes of Bradycardia ( 50/min ) & tachycardia ( 122/min ) note but no clinically significant arrhythmias seen. Some PVCs were recorded & few beat run of SVT at 142/min seen. No clinically significant arrhythmias seen.

## 2022-12-20 ENCOUNTER — OFFICE VISIT (OUTPATIENT)
Dept: CARDIOLOGY CLINIC | Age: 62
End: 2022-12-20
Payer: COMMERCIAL

## 2022-12-20 VITALS
BODY MASS INDEX: 31.33 KG/M2 | DIASTOLIC BLOOD PRESSURE: 80 MMHG | HEART RATE: 60 BPM | HEIGHT: 75 IN | SYSTOLIC BLOOD PRESSURE: 110 MMHG | WEIGHT: 252 LBS

## 2022-12-20 DIAGNOSIS — I48.0 PAROXYSMAL ATRIAL FIBRILLATION (HCC): Primary | ICD-10-CM

## 2022-12-20 DIAGNOSIS — Z86.79 S/P ABLATION OF ATRIAL FIBRILLATION: ICD-10-CM

## 2022-12-20 DIAGNOSIS — Z82.49 FAMILY HISTORY OF CORONARY ARTERY DISEASE: ICD-10-CM

## 2022-12-20 DIAGNOSIS — I10 PRIMARY HYPERTENSION: ICD-10-CM

## 2022-12-20 DIAGNOSIS — Z98.890 S/P ABLATION OF ATRIAL FIBRILLATION: ICD-10-CM

## 2022-12-20 PROCEDURE — 99214 OFFICE O/P EST MOD 30 MIN: CPT | Performed by: INTERNAL MEDICINE

## 2022-12-20 PROCEDURE — 93000 ELECTROCARDIOGRAM COMPLETE: CPT | Performed by: INTERNAL MEDICINE

## 2022-12-20 PROCEDURE — 3078F DIAST BP <80 MM HG: CPT | Performed by: INTERNAL MEDICINE

## 2022-12-20 PROCEDURE — 3074F SYST BP LT 130 MM HG: CPT | Performed by: INTERNAL MEDICINE

## 2022-12-20 NOTE — LETTER
[address] [id number]  Phone: [phone]  Fax: [phone]    Berhane Zimmerman MD    December 20, 2022     Isauro Saavedra DO, DO [address] [facility] [address] [facility]    Patient: Luisa Morse   MR Number: [id number]   YOB: 1960   Date of Visit: 12/20/2022       Dear Isauro Saavedra DO:    Thank you for referring Luisa Morse to me for evaluation/treatment. Below are the relevant portions of my assessment and plan of care. If you have questions, please do not hesitate to call me. I look forward to following [place] along with you.     Sincerely,      Berhane Zimmerman MD

## 2022-12-20 NOTE — PROGRESS NOTES
OFFICE PROGRESS NOTES      Kellen Salgado is a 60 y.o. male who has    CHIEF COMPLAINT AS FOLLOWS:  CHEST PAIN:  Patient denies any C/O chest pains at this time. SOB: C/O SOB with exertion. LEG EDEMA: No leg edema   PALPITATIONS:  C/O Palpitations, thinks he went back into A-fib last night. DIZZINESS: No C/O Dizziness   SYNCOPE: None   OTHER/ ADDITIONAL COMPLAINTS:                                     HPI: Patient is here for F/U on his A-fib Arrhythmia, HTN & Dyslipidemia problems. Arrhythmia: Patient has known  PAF S/P Ablation. HTN: Patient has known essential HTN. Has been treated with guideline recommended medical / physical/ diet therapy as stated below. Dyslipidemia: Patient has known mixed dyslipidemia. Has been treated with guideline recommended medical / physical/ diet therapy as stated below. Current Outpatient Medications   Medication Sig Dispense Refill    omeprazole (PRILOSEC) 20 MG delayed release capsule Take 20 mg by mouth daily      guaiFENesin (MUCINEX) 600 MG extended release tablet Take 1,200 mg by mouth 2 times daily      apixaban (ELIQUIS) 5 MG TABS tablet Take 1 tablet by mouth 2 times daily 180 tablet 1    tadalafil (CIALIS) 5 MG tablet TAKE 1 TABLET BY MOUTH EVERY DAY      Omega-3 Fatty Acids (FISH OIL PO) Take by mouth      aspirin EC 81 MG EC tablet Take 1 tablet by mouth daily 90 tablet 1    propranolol (INDERAL LA) 60 MG extended release capsule Take 1 capsule by mouth daily 90 capsule 3    apixaban (ELIQUIS) 5 MG TABS tablet Take 5 mg by mouth 2 times daily      lisinopril (PRINIVIL;ZESTRIL) 5 MG tablet Take 1 tablet by mouth daily. 30 tablet 6    citalopram (CELEXA) 20 MG tablet Take 40 mg by mouth daily       Multiple Vitamin (MULTIVITAMIN PO) Take 1 tablet by mouth daily. No current facility-administered medications for this visit. Allergies: Patient has no known allergies.   Review of Systems:    Constitutional: Negative for diaphoresis and fatigue  Respiratory: Negative for shortness of breath  Cardiovascular: Negative for chest pain, dyspnea on exertion, claudication, edema, irregular heartbeat, murmur, palpitations or shortness of breath  Musculoskeletal: Negative for muscle pain, muscular weakness, negative for pain in arm and leg or swelling in foot and leg    Objective:  /80   Pulse 64   Ht 6' 3\" (1.905 m)   Wt 252 lb (114.3 kg)   BMI 31.50 kg/m²   Wt Readings from Last 3 Encounters:   12/20/22 252 lb (114.3 kg)   11/15/22 244 lb (110.7 kg)   04/14/22 235 lb (106.6 kg)     Body mass index is 31.5 kg/m². GENERAL - Alert, oriented, pleasant, in no apparent distress. EYES: No jaundice, no conjunctival pallor. Neck - Supple. No jugular venous distention noted. No carotid bruits. Cardiovascular - Normal S1 and S2 without obvious murmur or gallop. Extremities - No cyanosis, clubbing, or significant edema. Pulmonary - No respiratory distress. No wheezes or rales.       MEDICAL DECISION MAKING & DATA REVIEW:    Lab Review   No results found for: TROPONINT  Lab Results   Component Value Date/Time    BNP 19 07/22/2012 02:05 PM     Lab Results   Component Value Date    INR 0.95 10/11/2021    INR 1.10 03/01/2021     No results found for: LABA1C  Lab Results   Component Value Date    WBC 11.9 (H) 10/15/2021    WBC 6.9 10/11/2021    HCT 41.3 (L) 10/15/2021    HCT 44.0 10/14/2021    MCV 95.8 10/15/2021    MCV 94.8 10/11/2021     10/15/2021     10/11/2021     Lab Results   Component Value Date    CHOL 176 09/14/2011    TRIG 55 09/14/2011    HDL 56 (L) 09/14/2011    LDLDIRECT 122 (H) 09/14/2011     Lab Results   Component Value Date    ALT 22 07/22/2012    ALT 28 06/19/2012    AST 32 07/22/2012    AST 33 06/19/2012     BMP:    Lab Results   Component Value Date/Time     11/16/2022 03:49 PM     10/15/2021 07:31 AM    K 4.7 11/16/2022 03:49 PM    K 3.7 10/15/2021 07:31 AM    CL 99 11/16/2022 03:49 PM  10/15/2021 07:31 AM    CO2 25 11/16/2022 03:49 PM    CO2 25 10/15/2021 07:31 AM    BUN 17 11/16/2022 03:49 PM    BUN 8 10/15/2021 07:31 AM    CREATININE 0.9 11/16/2022 03:49 PM    CREATININE 0.8 10/15/2021 07:31 AM     CMP:   Lab Results   Component Value Date/Time     11/16/2022 03:49 PM     10/15/2021 07:31 AM    K 4.7 11/16/2022 03:49 PM    K 3.7 10/15/2021 07:31 AM    CL 99 11/16/2022 03:49 PM     10/15/2021 07:31 AM    CO2 25 11/16/2022 03:49 PM    CO2 25 10/15/2021 07:31 AM    BUN 17 11/16/2022 03:49 PM    BUN 8 10/15/2021 07:31 AM    CREATININE 0.9 11/16/2022 03:49 PM    CREATININE 0.8 10/15/2021 07:31 AM    PROT 7.1 07/22/2012 02:05 PM    PROT 6.9 06/19/2012 03:16 PM     Lab Results   Component Value Date/Time    TSHHS 1.328 07/22/2012 02:05 PM    TSHHS 0.913 06/19/2012 03:16 PM       QUALITY MEASURES REVIEWED:  1.CAD:Patient is taking anti platelet agent:Yes  Patient does not have Hx of documented CAD  2. DYSLIPIDEMIA: Patient is on cholesterol lowering medication:Yes   3. Beta-Blocker therapy for CAD, if prior Myocardial Infarction:Yes   4. Counselled regarding smoking cessation. No   Patient does not Smoke. 5.Anticoagulation therapy (for A.Fib) Yes   Does Not have A.Fib.  6.Discussed weight management strategies. Assessment & Plan:  Primary / Secondary prevention is the goal by aggressive risk modification, healthy and therapeutic life style changes for cardiovascular risk reduction. CORONARY ARTERY DISEASE:None known. Current chest pain which is atypical & getting better could be related to COVID. ETT 10/2017   Excellent exercise capacity. 11 METs work load. Physiological BP response to exercise. ETT non diagnostic as THR is not achieved even at high work load. EKG: NSR, early repolarization. HTN:Yes  well controlled on current medical regimen, see list above.   - changes in  treatment:   no   CARDIOMYOPATHY:No  CONGESTIVE HEART FAILURE:No    VHD:no No significant VHD noted  ECHO 4/2022   Left ventricular function is normal, EF is estimated at 55-60%. Mild left ventricular hypertrophy. Grade I diastolic dysfunction. No regional wall motion abnormalities were detected. Mildly dilated left atrium. No significant valvular disease noted. Mild tricuspid regurgitation; RVSP is 22 mmHg. No evidence of pericardial effusion. TE-Echo 7/2021   There was no thrombus noted in the left atrial appendage. Negative bubble study. No PFO or ASD noted. No significant valvular disease noted. Successful cardioversion using 200 joules     DYSLIPIDEMIA: none known     ARRHYTHMIAS:yes, CHADS is 1  Atrial fibrillation S/P Ablation. Patient is back in NSR since 11/18/2022, converted spontaneously, after RECURRENCE OF A-Fib after a bout of cold. EKG: NSR 60 / min  On Eliquis 5 mg BID    12/12/2022 Baseline rhythm is normal sinus. Episodes of Bradycardia ( 50/min ) & tachycardia ( 122/min ) note but no clinically significant arrhythmias seen. Some PVCs were recorded & few beat run of SVT at 142/min seen. No clinically significant arrhythmias seen. TESTS ORDERED: none this visit     PREVIOUSLY ORDERED TESTS REVIEWED & DISCUSSED WITH THE PATIENT:     I personally reviewed & interpreted, all previously ordered tests as copied above. Latest Labs are pulled in to the note with dates. Labs, specially in Reference to Lipid profile, Cardiac testing in the form of Echo ( dated: ), stress tests ( dated: ) & other relevant cardiac testing reviewed with patient & recommendations made based on assessment of the results. Discussed role of Cardiac risk factors & effects + treatment of co morbidities with patient & advised accordingly. MEDICATIONS: List of medications patient is currently taking is reviewed in detail with the patient & family member present. Discussed any side effects or problems taking the medication.      Recommend Continue present management & medications as listed. Except can stop Eliquis, go back to ASA. No need to start Multaq. AFFIRMATION: I  reviewed patient's history, previous & current medical problems & all Labs + testing. This includes chart prep even prior to the vosit. Various goals are discussed and multiple questions answered. Relevant concelling performed. Office follow up in six months.

## 2022-12-20 NOTE — PATIENT INSTRUCTIONS
CORONARY ARTERY DISEASE:None known. Current chest pain which is atypical & getting better could be related to COVID. ETT 10/2017   Excellent exercise capacity. 11 METs work load. Physiological BP response to exercise. ETT non diagnostic as THR is not achieved even at high work load. EKG: NSR, early repolarization. HTN:Yes  well controlled on current medical regimen, see list above. - changes in  treatment:   no   CARDIOMYOPATHY:No  CONGESTIVE HEART FAILURE:No    VHD:no              No significant VHD noted  ECHO 4/2022   Left ventricular function is normal, EF is estimated at 55-60%. Mild left ventricular hypertrophy. Grade I diastolic dysfunction. No regional wall motion abnormalities were detected. Mildly dilated left atrium. No significant valvular disease noted. Mild tricuspid regurgitation; RVSP is 22 mmHg. No evidence of pericardial effusion. TE-Echo 7/2021   There was no thrombus noted in the left atrial appendage. Negative bubble study. No PFO or ASD noted. No significant valvular disease noted. Successful cardioversion using 200 joules     DYSLIPIDEMIA: none known     ARRHYTHMIAS:yes, CHADS is 1  Atrial fibrillation S/P Ablation. Patient is back in NSR since 11/18/2022, converted spontaneously, after RECURRENCE OF A-Fib after a bout of cold. EKG: NSR 60 / min  On Eliquis 5 mg BID    12/12/2022 Baseline rhythm is normal sinus. Episodes of Bradycardia ( 50/min ) & tachycardia ( 122/min ) note but no clinically significant arrhythmias seen. Some PVCs were recorded & few beat run of SVT at 142/min seen. No clinically significant arrhythmias seen. TESTS ORDERED: none this visit     PREVIOUSLY ORDERED TESTS REVIEWED & DISCUSSED WITH THE PATIENT:     I personally reviewed & interpreted, all previously ordered tests as copied above. Latest Labs are pulled in to the note with dates.    Labs, specially in Reference to Lipid profile, Cardiac testing in the form of Echo ( dated: ), stress tests ( dated: ) & other relevant cardiac testing reviewed with patient & recommendations made based on assessment of the results. Discussed role of Cardiac risk factors & effects + treatment of co morbidities with patient & advised accordingly. MEDICATIONS: List of medications patient is currently taking is reviewed in detail with the patient & family member present. Discussed any side effects or problems taking the medication. Recommend Continue present management & medications as listed. Except can stop Eliquis, go back to ASA. No need to start Multaq. AFFIRMATION: I  reviewed patient's history, previous & current medical problems & all Labs + testing. This includes chart prep even prior to the vosit. Various goals are discussed and multiple questions answered. Relevant concelling performed.      Office follow up in six months

## 2022-12-20 NOTE — PROGRESS NOTES
Atrial Fibrillation CHADSVASC2 Score Stroke Risk:   60 y.o. <65 - 0    male Male - 0   CHF HX: No - 0   HTN HX: Yes - 1   Stroke/TIA/Thromboembolism No - 0   Vascular Disease HX: No - 0   Diabetes Mellitus No - 0   CHADSVASC 2 Score 1      Annual Stroke Risk 0.6% - low-moderate risk

## 2024-07-16 ENCOUNTER — OFFICE VISIT (OUTPATIENT)
Dept: CARDIOLOGY CLINIC | Age: 64
End: 2024-07-16
Payer: COMMERCIAL

## 2024-07-16 VITALS
BODY MASS INDEX: 28.65 KG/M2 | HEIGHT: 75 IN | OXYGEN SATURATION: 99 % | HEART RATE: 53 BPM | WEIGHT: 230.4 LBS | DIASTOLIC BLOOD PRESSURE: 78 MMHG | SYSTOLIC BLOOD PRESSURE: 122 MMHG

## 2024-07-16 DIAGNOSIS — Z86.79 S/P ABLATION OF ATRIAL FIBRILLATION: ICD-10-CM

## 2024-07-16 DIAGNOSIS — I48.0 PAROXYSMAL ATRIAL FIBRILLATION (HCC): Primary | ICD-10-CM

## 2024-07-16 DIAGNOSIS — I49.9 IRREGULAR HEART BEAT: ICD-10-CM

## 2024-07-16 DIAGNOSIS — I10 PRIMARY HYPERTENSION: ICD-10-CM

## 2024-07-16 DIAGNOSIS — R00.2 PALPITATION: ICD-10-CM

## 2024-07-16 DIAGNOSIS — Z82.49 FAMILY HISTORY OF CORONARY ARTERY DISEASE: ICD-10-CM

## 2024-07-16 DIAGNOSIS — Z98.890 S/P ABLATION OF ATRIAL FIBRILLATION: ICD-10-CM

## 2024-07-16 PROCEDURE — 3078F DIAST BP <80 MM HG: CPT | Performed by: INTERNAL MEDICINE

## 2024-07-16 PROCEDURE — 99213 OFFICE O/P EST LOW 20 MIN: CPT | Performed by: INTERNAL MEDICINE

## 2024-07-16 PROCEDURE — 3074F SYST BP LT 130 MM HG: CPT | Performed by: INTERNAL MEDICINE

## 2024-07-16 PROCEDURE — 93000 ELECTROCARDIOGRAM COMPLETE: CPT | Performed by: INTERNAL MEDICINE

## 2024-07-16 RX ORDER — SILDENAFIL 100 MG/1
100 TABLET, FILM COATED ORAL PRN
COMMUNITY
Start: 2024-06-10

## 2024-07-16 RX ORDER — BUPROPION HYDROCHLORIDE 150 MG/1
150 TABLET ORAL DAILY
COMMUNITY

## 2024-07-16 NOTE — PATIENT INSTRUCTIONS
form of Echo ( dated: ), stress tests ( dated: ) & other relevant cardiac testing reviewed with patient & recommendations made based on assessment of the results.    Discussed role of Cardiac risk factors & effects + treatment of co morbidities with patient & advised accordingly.     MEDICATIONS: List of medications patient is currently taking is reviewed in detail with the patient. Discussed any side effects or problems taking the medication.     Recommend Continue present management & medications as listed.     AFFIRMATION: I spent at least 20 minutes of time reviewing patient's history, previous & current medical problems & all Labs + testing. This includes chart prep even prior to the vosit. Various goals are discussed and multiple questions answered.Relevant concelling performed.     Office follow up in one year.

## 2024-07-16 NOTE — PROGRESS NOTES
in Reference to Lipid profile, Cardiac testing in the form of Echo ( dated: ), stress tests ( dated: ) & other relevant cardiac testing reviewed with patient & recommendations made based on assessment of the results.    Discussed role of Cardiac risk factors & effects + treatment of co morbidities with patient & advised accordingly.     MEDICATIONS: List of medications patient is currently taking is reviewed in detail with the patient. Discussed any side effects or problems taking the medication.     Recommend Continue present management & medications as listed.     AFFIRMATION: I spent at least 20 minutes of time reviewing patient's history, previous & current medical problems & all Labs + testing. This includes chart prep even prior to the vosit. Various goals are discussed and multiple questions answered.Relevant concelling performed.     Office follow up in one year.

## 2025-06-30 NOTE — PROGRESS NOTES
Patient Name: Eliseo Zuniga  : 1960  MRN# [id number]    REASON FOR VISIT: 1 year follow up  Patient Active Problem List    Diagnosis Date Noted    S/P ablation of atrial fibrillation 10/14/2021    Family history of coronary artery disease     HTN (hypertension)     Atrial fibrillation (HCC)     Ulcerative colitis (HCC)     Familial tremor     Ulcerative colitis, chronic (HCC) 2012     LABS:  Lab Results   Component Value Date    CHOL 176 2011    TRIG 55 2011    HDL 56 (L) 2011    LDLDIRECT 122 (H) 2011   No results found for: \"LABA1C\", \"[id number]\"     ABLATION DATE:  10/21

## 2025-07-01 ENCOUNTER — OFFICE VISIT (OUTPATIENT)
Dept: CARDIOLOGY CLINIC | Age: 65
End: 2025-07-01

## 2025-07-01 VITALS
WEIGHT: 236.8 LBS | HEIGHT: 75 IN | HEART RATE: 52 BPM | BODY MASS INDEX: 29.44 KG/M2 | SYSTOLIC BLOOD PRESSURE: 130 MMHG | DIASTOLIC BLOOD PRESSURE: 62 MMHG

## 2025-07-01 DIAGNOSIS — Z86.79 S/P ABLATION OF ATRIAL FIBRILLATION: ICD-10-CM

## 2025-07-01 DIAGNOSIS — Z98.890 S/P ABLATION OF ATRIAL FIBRILLATION: ICD-10-CM

## 2025-07-01 DIAGNOSIS — I48.0 PAROXYSMAL ATRIAL FIBRILLATION (HCC): ICD-10-CM

## 2025-07-01 DIAGNOSIS — I10 PRIMARY HYPERTENSION: Primary | ICD-10-CM

## 2025-07-01 DIAGNOSIS — Z82.49 FAMILY HISTORY OF CORONARY ARTERY DISEASE: ICD-10-CM

## 2025-07-01 PROCEDURE — 93000 ELECTROCARDIOGRAM COMPLETE: CPT | Performed by: INTERNAL MEDICINE

## 2025-07-01 PROCEDURE — 3078F DIAST BP <80 MM HG: CPT | Performed by: INTERNAL MEDICINE

## 2025-07-01 PROCEDURE — 99213 OFFICE O/P EST LOW 20 MIN: CPT | Performed by: INTERNAL MEDICINE

## 2025-07-01 PROCEDURE — 1123F ACP DISCUSS/DSCN MKR DOCD: CPT | Performed by: INTERNAL MEDICINE

## 2025-07-01 PROCEDURE — 3075F SYST BP GE 130 - 139MM HG: CPT | Performed by: INTERNAL MEDICINE

## 2025-07-01 RX ORDER — ASPIRIN 325 MG
325 TABLET ORAL DAILY
COMMUNITY

## 2025-07-01 RX ORDER — ALFUZOSIN HYDROCHLORIDE 10 MG/1
10 TABLET, EXTENDED RELEASE ORAL DAILY
COMMUNITY
Start: 2025-05-21

## 2025-07-01 NOTE — PROGRESS NOTES
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 - No  Have you had any Shortness of Breath - No  Have you had any dizziness - No  Have you had any palpitations recently? - No  Do you have any edema - swelling in No      When did you have your last labs drawn 12/24  What doctor ordered Unknown  Do we have the labs in their chart care everywhere  If we do not have the labs, ask where they were drawn Hospital other than [facility]    Do you need any prescriptions refilled? - No    Do you have a surgery or procedure scheduled in the near future - No    Do use tobacco products? - No  Do you drink alcohol? - Yes - 3 x weekly  Do you use any illicit drugs? - No  Caffeine? - Yes  How much caffeine? 3-4 cups of half and half daily     Check medication list thoroughly!!! AND RECONCILE OUTSIDE MEDICATIONS  If dose has changed change the entire order not just the MG  BE SURE TO ASK PATIENT IF THEY NEED MEDICATION REFILLS  Verify Pharmacy and update if incorrect

## 2025-07-01 NOTE — PATIENT INSTRUCTIONS
Thank you for allowing us to care for you today!   We want to ensure we can follow your treatment plan and we strive to give you the best outcomes and experience possible.   If you ever have a life threatening emergency and call 911 - for an ambulance (EMS)  REMEMBER  Our providers can only care for you at:   [facility] or [facility]   Even if you have someone take you or you drive yourself we can only care for you in a [facility] facility. Our providers are not setup at the other healthcare locations!    PLEASE CALL OUR OFFICE DURING NORMAL BUSINESS HOURS  Monday through Friday 8 am to 5 pm  AFTER HOURS the physician on-call cannot help with scheduling, rescheduling, procedure instruction questions or any type of medication need or issue.  [facility] P:[phone] - [facility] P:[phone] - [facility] P:[phone]      If you receive a survey:  We would appreciate you taking the time to share your experience concerning your provider visit in the office.    These surveys are confidential!  We are eager to improve and are counting on you to share your feedback so we can ensure you get the best care possible.  CORONARY ARTERY DISEASE:None known. Current chest pain which is atypical & getting better could be related to COVID.  ETT 10/2017   Excellent exercise capacity. 11 METs work load.    Physiological BP response to exercise.    ETT non diagnostic as THR is not achieved even at high work load.      EKG: Sinus bradycardia, 52/min, early repolarization.     HTN:Yes  well controlled on current medical regimen, see list above.  - changes in  treatment:   no   CARDIOMYOPATHY:No  CONGESTIVE HEART FAILURE:No    VHD:no              No significant VHD noted  ECHO 4/2022   Left ventricular function is normal, EF is estimated at 55-60%.   Mild left ventricular hypertrophy.   Grade I diastolic dysfunction.   No regional wall motion abnormalities were

## 2025-07-01 NOTE — PROGRESS NOTES
OFFICE PROGRESS NOTES      Eliseo is a 64 y.o. male who has    CHIEF COMPLAINT AS FOLLOWS:  CHEST PAIN: Patient denies any C/O chest pains at this time.      SOB: C/O SOB with exertion.              LEG EDEMA: No leg edema   PALPITATIONS:  C/O Palpitations, thinks he went back into A-fib last night.   DIZZINESS: No C/O Dizziness   SYNCOPE: None   OTHER/ ADDITIONAL COMPLAINTS:                                     HPI: Patient is here for F/U on his PA-Fib  Arrhythmia, HTN & Dyslipidemia problems.     Arrhythmia: Patient has known PAF S/P Ablation.  HTN: Patient has known essential HTN. Has been treated with guideline recommended medical / physical/ diet therapy as stated below.  Dyslipidemia: Patient has known mixed dyslipidemia. Has been treated with guideline recommended medical / physical/ diet therapy as stated below.                Current Outpatient Medications   Medication Sig Dispense Refill    alfuzosin (UROXATRAL) 10 MG extended release tablet Take 1 tablet by mouth daily      buPROPion (WELLBUTRIN XL) 150 MG extended release tablet Take 1 tablet by mouth daily      sildenafil (VIAGRA) 100 MG tablet Take 1 tablet by mouth as needed      omeprazole (PRILOSEC) 20 MG delayed release capsule Take 1 capsule by mouth daily      propranolol (INDERAL LA) 60 MG extended release capsule Take 1 capsule by mouth daily 90 capsule 3    lisinopril (PRINIVIL;ZESTRIL) 5 MG tablet Take 1 tablet by mouth daily. 30 tablet 6    Multiple Vitamin (MULTIVITAMIN PO) Take 1 tablet by mouth daily      Multiple Vitamins-Minerals (MULTIVITAMIN ADULT, MINERALS, PO) Multivitamin Adult (Patient not taking: Reported on 7/1/2025)      guaiFENesin (MUCINEX) 600 MG extended release tablet Take 2 tablets by mouth 2 times daily      apixaban (ELIQUIS) 5 MG TABS tablet Take 1 tablet by mouth 2 times daily 180 tablet 1    tadalafil (CIALIS) 5 MG tablet TAKE 1 TABLET BY MOUTH EVERY DAY      Omega-3 Fatty Acids (FISH OIL PO) Take by mouth